# Patient Record
Sex: MALE | Race: OTHER | NOT HISPANIC OR LATINO | Employment: FULL TIME | URBAN - METROPOLITAN AREA
[De-identification: names, ages, dates, MRNs, and addresses within clinical notes are randomized per-mention and may not be internally consistent; named-entity substitution may affect disease eponyms.]

---

## 2017-04-04 DIAGNOSIS — E78.5 DYSLIPIDEMIA: ICD-10-CM

## 2017-04-04 DIAGNOSIS — K21.9 GASTROESOPHAGEAL REFLUX DISEASE WITHOUT ESOPHAGITIS: ICD-10-CM

## 2017-04-04 RX ORDER — OMEPRAZOLE 40 MG/1
40 CAPSULE, DELAYED RELEASE ORAL
Qty: 30 CAP | Refills: 11 | Status: SHIPPED | OUTPATIENT
Start: 2017-04-04 | End: 2018-03-28 | Stop reason: SDUPTHER

## 2017-04-04 RX ORDER — SIMVASTATIN 40 MG
40 TABLET ORAL
Qty: 30 TAB | Refills: 11 | Status: SHIPPED | OUTPATIENT
Start: 2017-04-04 | End: 2018-03-28 | Stop reason: SDUPTHER

## 2017-04-13 ENCOUNTER — OFFICE VISIT (OUTPATIENT)
Dept: MEDICAL GROUP | Facility: MEDICAL CENTER | Age: 62
End: 2017-04-13
Payer: COMMERCIAL

## 2017-04-13 VITALS
OXYGEN SATURATION: 96 % | RESPIRATION RATE: 16 BRPM | TEMPERATURE: 96.8 F | HEART RATE: 79 BPM | BODY MASS INDEX: 36.17 KG/M2 | DIASTOLIC BLOOD PRESSURE: 80 MMHG | SYSTOLIC BLOOD PRESSURE: 138 MMHG | HEIGHT: 76 IN | WEIGHT: 297 LBS

## 2017-04-13 DIAGNOSIS — J06.9 UPPER RESPIRATORY TRACT INFECTION, UNSPECIFIED TYPE: ICD-10-CM

## 2017-04-13 PROCEDURE — 99214 OFFICE O/P EST MOD 30 MIN: CPT | Performed by: NURSE PRACTITIONER

## 2017-04-13 RX ORDER — PROMETHAZINE HYDROCHLORIDE AND CODEINE PHOSPHATE 6.25; 1 MG/5ML; MG/5ML
5 SYRUP ORAL 4 TIMES DAILY PRN
Qty: 120 ML | Refills: 0 | Status: SHIPPED | OUTPATIENT
Start: 2017-04-13 | End: 2017-04-27

## 2017-04-13 RX ORDER — DOXYCYCLINE HYCLATE 100 MG
100 TABLET ORAL 2 TIMES DAILY
Qty: 20 TAB | Refills: 0 | Status: SHIPPED | OUTPATIENT
Start: 2017-04-13 | End: 2017-04-23

## 2017-04-13 ASSESSMENT — ENCOUNTER SYMPTOMS
SORE THROAT: 1
COUGH: 1
SPUTUM PRODUCTION: 1

## 2017-04-13 NOTE — PROGRESS NOTES
Subjective:      Elver Campos is a 61 y.o. male who presents with Conjunctivitis and Cough            Conjunctivitis  Associated symptoms include congestion, coughing and a sore throat.   Cough  Associated symptoms include a sore throat.   Elver Campos is a patient of Dr. San here today for new problem with cough.    Patient reports he has been ill for almost a week and it is not improving. His symptoms include cough, sore throat, recent left eye irritation, insomnia, and expectoration of small amounts of phlegm. He denies fever, history of allergies, chills, myalgias, chest pain or shortness of breath. He has had his flu shot and pneumonia shot this year. He has tried over-the-counter products but they have not helped. He does smoke occasional cigars. Symptoms are worse when he lays down at night and nothing has made him feel better. He states he does have lab work still pending for Dr. San for his diabetes which he plans on doing in the next few weeks.        Current Outpatient Prescriptions   Medication Sig Dispense Refill   • doxycycline (VIBRAMYCIN) 100 MG Tab Take 1 Tab by mouth 2 times a day for 10 days. 20 Tab 0   • promethazine-codeine (PHENERGAN-CODEINE) 6.25-10 MG/5ML Syrup Take 5 mL by mouth 4 times a day as needed. 120 mL 0   • omeprazole (PRILOSEC) 40 MG delayed-release capsule Take 1 Cap by mouth every day. 30 Cap 11   • simvastatin (ZOCOR) 40 MG Tab Take 1 Tab by mouth every bedtime. TAKE 1 TAB BY MOUTH EVERY EVENING. 30 Tab 11   • BD PEN NEEDLE DIPESH U/F 32G X 4 MM Misc USE 1 DAILY AS DIRECTED 100 Each 2   • metformin (GLUCOPHAGE) 1000 MG tablet Take 1 Tab by mouth 2 times a day, with meals. 60 Tab 5   • Blood Glucose Monitoring Suppl SUPPLIES Misc Test strips order: Test strips for One Touch Ultra  meter. Sig: use qday. and prn ssx high or low sugar #100 RF x 3 100 Each 11   • liraglutide (VICTOZA) 18 MG/3ML Solution Pen-injector injection Inject 0.3 mL as instructed every day. 3  "PEN 3   • Empagliflozin (JARDIANCE) 25 MG Tab Take 25 mg by mouth every day. 90 Tab 3   • hydrochlorothiazide (HYDRODIURIL) 25 MG Tab Take 1 Tab by mouth every day. TAKE 1 TABLET BY MOUTH DAILY 30 Tab 0   • losartan (COZAAR) 100 MG Tab Take 1 Tab by mouth every day. 30 Tab 11   • Blood Glucose Monitoring Suppl BRYCE Meter: Dispense One Touch Ultra  meter. Sig. Use as directed for blood sugar monitoring. #1. NR. 1 Device 0   • Lancets MISC Lancets order: Lancets for One Touch Ultra meter. Sig: use qday and prn ssx high or low sugar. #100 RF x 3 100 Each 3   • vitamin D (CHOLECALCIFEROL) 1000 UNIT TABS Take 1,000 Units by mouth every day.       • albuterol (PROVENTIL) 90 MCG/ACT AERS Inhale 2 Puffs by mouth every 6 hours as needed for Shortness of Breath. 1 Inhaler 3   • aspirin 81 MG EC tablet Take  by mouth.       No current facility-administered medications for this visit.     Social History   Substance Use Topics   • Smoking status: Current Some Day Smoker     Types: Cigars   • Smokeless tobacco: Never Used      Comment: 2 cigar 1 mo   • Alcohol Use: No     Past Medical History   Diagnosis Date   • Glucose intolerance (impaired glucose tolerance) 5/19/2009   • HTN 5/19/2009   • Obesity 5/19/2009   • DM (diabetes mellitus) (CMS-[facility]) 1/18/2011     Family History   Problem Relation Age of Onset   • Genetic Neg Hx    • Diabetes Mother        Review of Systems   HENT: Positive for congestion and sore throat.    Respiratory: Positive for cough and sputum production.    All other systems reviewed and are negative.         Objective:     /80 mmHg  Pulse 79  Temp(Src) 36 °C (96.8 °F)  Resp 16  Ht 1.93 m (6' 3.98\")  Wt 134.718 kg (297 lb)  BMI 36.17 kg/m2  SpO2 96%     Physical Exam   Constitutional: He is oriented to person, place, and time. He appears well-developed and well-nourished. No distress.   HENT:   Head: Normocephalic and atraumatic.   Right Ear: External ear normal.   Left Ear: External ear normal. "   Nose: Mucosal edema and rhinorrhea present.   Mouth/Throat: Oropharynx is clear and moist.   Eyes: Conjunctivae are normal. Right eye exhibits no discharge. Left eye exhibits no discharge.   Neck: Normal range of motion. Neck supple. No tracheal deviation present. No thyromegaly present.   Cardiovascular: Normal rate, regular rhythm and normal heart sounds.    No murmur heard.  Pulmonary/Chest: Effort normal and breath sounds normal. No respiratory distress. He has no wheezes. He has no rales.   Lymphadenopathy:     He has no cervical adenopathy.   Neurological: He is alert and oriented to person, place, and time. Coordination normal.   Skin: Skin is warm and dry. No rash noted. He is not diaphoretic. No erythema.   Psychiatric: He has a normal mood and affect. His behavior is normal. Judgment and thought content normal.   Nursing note and vitals reviewed.              Assessment/Plan:     1. Upper respiratory tract infection, unspecified type  Teaching included:  A. Drink plenty of fluids to keep yourself hydrated. Warm fluids like tea and hot soup are better.  B. Increase humidity in the house with a humidifier or place your head over a steaming pot.  C. Use Tylenol or Motrin for aches, pains, or fever.  D. Get plenty of rest to allow your body time to heal.  E. Use OTC Mucinex to loosen mucous.  F. Try OTC Afrin for up to 3 days for nasal congestion. Avoid Sudafed products.  G. Contact the office or return for appt. if you develope worsening symptoms or do not improve in the next week.  H. Avoid lung irritants such as tobacco smoke or blowing dust.  - doxycycline (VIBRAMYCIN) 100 MG Tab; Take 1 Tab by mouth 2 times a day for 10 days.  Dispense: 20 Tab; Refill: 0. Patient to start medicine if no improvement with treatments listed above.  - promethazine-codeine (PHENERGAN-CODEINE) 6.25-10 MG/5ML Syrup; Take 5 mL by mouth 4 times a day as needed.  Dispense: 120 mL; Refill: 0. Patient reminded only to use the  medicine when he does not need to be alert.    Patient to do lab work and follow-up with Dr. San for his diabetes.

## 2017-04-13 NOTE — MR AVS SNAPSHOT
"Elver Campos   2017 3:20 PM   Office Visit   MRN: [id number]    Department:  [address] [place] [address]   Dept Phone:  [phone]    Description:  Male : 1955   Provider:  ISH Muñiz           Reason for Visit     Conjunctivitis left eye started this morning    Cough x 6 days       Allergies as of 2017     No Known Allergies      You were diagnosed with     Upper respiratory tract infection, unspecified type   [[id number]]         Vital Signs     Blood Pressure Pulse Temperature Respirations Height Weight    138/80 mmHg 79 36 °C (96.8 °F) 16 1.93 m (6' 3.98\") 134.718 kg (297 lb)    Body Mass Index Oxygen Saturation Smoking Status             36.17 kg/m2 96% Current Some Day Smoker         Basic Information     Date Of Birth Sex Race Ethnicity Preferred Language    1955 Male Other Non- English      Problem List              ICD-10-CM Priority Class Noted - Resolved    DM II (diabetes mellitus, type II), controlled (CMS-HCC) E11.9   2011 - Present    Dyslipidemia E78.5   2011 - Present    Tobacco use Z72.0   9/3/2014 - Present    Obesity, Class II, BMI 35-39.9 (CMS-HCC) E66.01   9/3/2014 - Present    Gastroesophageal reflux disease without esophagitis K21.9   2015 - Present    Essential hypertension I10   2015 - Present      Health Maintenance        Date Due Completion Dates    IMM HEP B VACCINE (1 of 3 - Primary Series) 1955 ---    IMM DTaP/Tdap/Td Vaccine (1 - Tdap) 1975 ---    IMM ZOSTER VACCINE 2016 ---    A1C SCREENING 2016, 2015, 2015, 2014, 2013, 2013, 2012, 2012, 2011, 2011, 2010    URINE ACR / MICROALBUMIN 2017, 2014, 2013, 2013, 2012    FASTING LIPID PROFILE 2017 (Done), 2014, 2013, 2013, 2012, 2012, 2011, 2011, 2010    Override on 2016: Done    SERUM CREATININE 2017 " 7/8/2016 (Done), 8/29/2014, 11/14/2013, 2/20/2013, 8/23/2012, 2/8/2012, 6/21/2011, 1/11/2011, 4/14/2010    Override on 7/8/2016: Done    DIABETES MONOFILAMENT / LE EXAM 7/8/2017 7/8/2016, 1/27/2015, 12/9/2013, 2/25/2013 (Done), 7/20/2011 (Done)    Override on 2/25/2013: Done    Override on 7/20/2011: Done    RETINAL SCREENING 7/19/2017 7/19/2016 (Done), 4/15/2015, 11/20/2013    Override on 7/19/2016: Done    COLONOSCOPY 2/18/2018 2/18/2015            Current Immunizations     Influenza Vaccine Quad Inj (Preserved) 10/2/2015    Pneumococcal polysaccharide vaccine (PPSV-23) 10/18/2016      Below and/or attached are the medications your provider expects you to take. Review all of your home medications and newly ordered medications with your provider and/or pharmacist. Follow medication instructions as directed by your provider and/or pharmacist. Please keep your medication list with you and share with your provider. Update the information when medications are discontinued, doses are changed, or new medications (including over-the-counter products) are added; and carry medication information at all times in the event of emergency situations     Allergies:  No Known Allergies          Medications  Valid as of: April 13, 2017 -  3:42 PM    Generic Name Brand Name Tablet Size Instructions for use    Albuterol (Aero Soln) PROVENTIL 90 MCG/ACT Inhale 2 Puffs by mouth every 6 hours as needed for Shortness of Breath.        Aspirin (Tablet Delayed Response) aspirin 81 MG Take  by mouth.        Blood Glucose Monitoring Suppl (Device) Blood Glucose Monitoring Suppl  Meter: Dispense One Touch Ultra  meter. Sig. Use as directed for blood sugar monitoring. #1. NR.        Blood Glucose Monitoring Suppl (Misc) Blood Glucose Monitoring Suppl SUPPLIES Test strips order: Test strips for One Touch Ultra  meter. Sig: use qday. and prn ssx high or low sugar #100 RF x 3        Cholecalciferol (Tab) cholecalciferol 1000 UNIT Take 1,000 Units  by mouth every day.          Doxycycline Hyclate (Tab) VIBRAMYCIN 100 MG Take 1 Tab by mouth 2 times a day for 10 days.        Empagliflozin (Tab) Empagliflozin 25 MG Take 25 mg by mouth every day.        HydroCHLOROthiazide (Tab) HYDRODIURIL 25 MG Take 1 Tab by mouth every day. TAKE 1 TABLET BY MOUTH DAILY        Insulin Pen Needle (Misc) BD PEN NEEDLE DIPESH U/F 32G X 4 MM USE 1 DAILY AS DIRECTED        Lancets (Misc) Lancets  Lancets order: Lancets for One Touch Ultra meter. Sig: use qday and prn ssx high or low sugar. #100 RF x 3        Liraglutide (Solution Pen-injector) VICTOZA 18 MG/3ML Inject 0.3 mL as instructed every day.        Losartan Potassium (Tab) COZAAR 100 MG Take 1 Tab by mouth every day.        MetFORMIN HCl (Tab) GLUCOPHAGE 1000 MG Take 1 Tab by mouth 2 times a day, with meals.        Omeprazole (CAPSULE DELAYED RELEASE) PRILOSEC 40 MG Take 1 Cap by mouth every day.        Promethazine-Codeine (Syrup) PHENERGAN-CODEINE 6.25-10 MG/5ML Take 5 mL by mouth 4 times a day as needed.        Simvastatin (Tab) ZOCOR 40 MG Take 1 Tab by mouth every bedtime. TAKE 1 TAB BY MOUTH EVERY EVENING.        .                 Medicines prescribed today were sent to:     [id number] - [place] [address] [facility]    [address] [place] [address]    Phone: [phone] Fax: [phone]    Open 24 Hours?: No      Medication refill instructions:       If your prescription bottle indicates you have medication refills left, it is not necessary to call your provider’s office. Please contact your pharmacy and they will refill your medication.    If your prescription bottle indicates you do not have any refills left, you may request refills at any time through one of the following ways: The online [facility] system (except Urgent Care), by calling your provider’s office, or by asking your pharmacy to contact your provider’s office with a refill request. Medication refills are processed only during regular business  hours and may not be available until the next business day. Your provider may request additional information or to have a follow-up visit with you prior to refilling your medication.   *Please Note: Medication refills are assigned a new Rx number when refilled electronically. Your pharmacy may indicate that no refills were authorized even though a new prescription for the same medication is available at the pharmacy. Please request the medicine by name with the pharmacy before contacting your provider for a refill.           [employer] Access Code: [email or web address]  Expires: 5/13/2017  3:42 PM    [employer]  A secure, online tool to manage your health information     [employer]’s [employer]® is a secure, online tool that connects you to your personalized health information from the privacy of your home -- day or night - making it very easy for you to manage your healthcare. Once the activation process is completed, you can even access your medical information using the [employer] roxana, which is available for free in the Apple Roxana store or Google Play store.     [employer] provides the following levels of access (as shown below):   My Chart Features   [facility]  Specialists [facility]  Urgent  Care Non-[facility]  Primary Care  Doctor   Email your healthcare team securely and privately 24/7 X X X    Manage appointments: schedule your next appointment; view details of past/upcoming appointments X      Request prescription refills. X      View recent personal medical records, including lab and immunizations X X X X   View health record, including health history, allergies, medications X X X X   Read reports about your outpatient visits, procedures, consult and ER notes X X X X   See your discharge summary, which is a recap of your hospital and/or ER visit that includes your diagnosis, lab results, and care plan. X X       How to register for [employer]:  1. Go to  [email or web address].  2. Click on the Sign Up  Now box, which takes you to the New Member Sign Up page. You will need to provide the following information:  a. Enter your [employer] Access Code exactly as it appears at the top of this page. (You will not need to use this code after you’ve completed the sign-up process. If you do not sign up before the expiration date, you must request a new code.)   b. Enter your date of birth.   c. Enter your home email address.   d. Click Submit, and follow the next screen’s instructions.  3. Create a [employer] ID. This will be your [employer] login ID and cannot be changed, so think of one that is secure and easy to remember.  4. Create a [employer] password. You can change your password at any time.  5. Enter your Password Reset Question and Answer. This can be used at a later time if you forget your password.   6. Enter your e-mail address. This allows you to receive e-mail notifications when new information is available in [employer].  7. Click Sign Up. You can now view your health information.    For assistance activating your [employer] account, call [phone]        Quit Tobacco Information     Do you want to quit using tobacco?    Quitting tobacco decreases risks of cancer, heart and lung disease, increases life expectancy, improves sense of taste and smell, and increases spending money, among other benefits.    If you are thinking about quitting, we can help.  • [facility] Quit Tobacco Program: [phone]  o Program occurs weekly for four weeks and includes pharmacist consultation on products to support quitting smoking or chewing tobacco. A provider referral is needed for pharmacist consultation.  • Tobacco Users Help Hotline: 7-800-QUIT-NOW (706-9193) or https://[place].quitlogix.org/  o Free, confidential telephone and online coaching for [place] residents. Sessions are designed on a schedule that is convenient for you. Eligible clients receive free nicotine replacement therapy.  • Nationally: www.smokefree.gov  o Information and  professional assistance to support both immediate and long-term needs as you become, and remain, a non-smoker. Smokefree.gov allows you to choose the help that best fits your needs.

## 2017-04-27 DIAGNOSIS — I10 ESSENTIAL HYPERTENSION: ICD-10-CM

## 2017-04-27 RX ORDER — HYDROCHLOROTHIAZIDE 25 MG/1
25 TABLET ORAL DAILY
Qty: 90 TAB | OUTPATIENT
Start: 2017-04-27

## 2017-04-27 RX ORDER — HYDROCHLOROTHIAZIDE 25 MG/1
25 TABLET ORAL DAILY
Qty: 30 TAB | Refills: 11 | Status: SHIPPED | OUTPATIENT
Start: 2017-04-27 | End: 2018-03-28 | Stop reason: SDUPTHER

## 2017-06-23 ENCOUNTER — TELEPHONE (OUTPATIENT)
Dept: MEDICAL GROUP | Facility: MEDICAL CENTER | Age: 62
End: 2017-06-23

## 2017-06-23 RX ORDER — HYDROCORTISONE ACETATE 25 MG/1
25 SUPPOSITORY RECTAL EVERY 12 HOURS
Qty: 20 SUPPOSITORY | Refills: 0 | Status: SHIPPED | OUTPATIENT
Start: 2017-06-23 | End: 2020-06-30

## 2017-06-23 NOTE — TELEPHONE ENCOUNTER
1. Caller Name: Marlon patient wife                                         Call Back Number: [phone]        Patient approves a detailed voicemail message: N\A    Marlon called for the patient she said the patient wanted to know if you would order a medication for hemorrhoids, because patient is in a lot of pain and has tried otc medication for this but it is not helping? Please advise

## 2017-08-03 LAB
ALBUMIN SERPL-MCNC: 4.4 G/DL (ref 3.6–4.8)
ALBUMIN/CREAT UR: 26.2 MG/G CREAT (ref 0–30)
ALBUMIN/GLOB SERPL: 1.3 {RATIO} (ref 1.2–2.2)
ALP SERPL-CCNC: 68 IU/L (ref 39–117)
ALT SERPL-CCNC: 24 IU/L (ref 0–32)
AST SERPL-CCNC: 26 IU/L (ref 0–40)
BILIRUB SERPL-MCNC: 0.4 MG/DL (ref 0–1.2)
BUN SERPL-MCNC: 13 MG/DL (ref 8–27)
BUN/CREAT SERPL: 11 (ref 12–28)
CALCIUM SERPL-MCNC: 9.7 MG/DL (ref 8.7–10.3)
CHLORIDE SERPL-SCNC: 96 MMOL/L (ref 96–106)
CHOLEST SERPL-MCNC: 119 MG/DL (ref 100–199)
CO2 SERPL-SCNC: 25 MMOL/L (ref 18–29)
COMMENT 011824: NORMAL
CREAT SERPL-MCNC: 1.14 MG/DL (ref 0.57–1)
CREAT UR-MCNC: 145.3 MG/DL
GLOBULIN SER CALC-MCNC: 3.3 G/DL (ref 1.5–4.5)
GLUCOSE SERPL-MCNC: 113 MG/DL (ref 65–99)
HBA1C MFR BLD: 7.2 % (ref 4.8–5.6)
HDLC SERPL-MCNC: 43 MG/DL
LDLC SERPL CALC-MCNC: 47 MG/DL (ref 0–99)
MICROALBUMIN UR-MCNC: 38 UG/ML
POTASSIUM SERPL-SCNC: 3.8 MMOL/L (ref 3.5–5.2)
PROT SERPL-MCNC: 7.7 G/DL (ref 6–8.5)
SODIUM SERPL-SCNC: 139 MMOL/L (ref 134–144)
TRIGL SERPL-MCNC: 147 MG/DL (ref 0–149)
VLDLC SERPL CALC-MCNC: 29 MG/DL (ref 5–40)

## 2017-08-08 ENCOUNTER — OFFICE VISIT (OUTPATIENT)
Dept: MEDICAL GROUP | Facility: MEDICAL CENTER | Age: 62
End: 2017-08-08
Payer: COMMERCIAL

## 2017-08-08 VITALS
HEART RATE: 80 BPM | BODY MASS INDEX: 35.56 KG/M2 | WEIGHT: 292 LBS | HEIGHT: 76 IN | SYSTOLIC BLOOD PRESSURE: 132 MMHG | RESPIRATION RATE: 16 BRPM | OXYGEN SATURATION: 95 % | TEMPERATURE: 98.4 F | DIASTOLIC BLOOD PRESSURE: 78 MMHG

## 2017-08-08 DIAGNOSIS — Z00.00 WELLNESS EXAMINATION: ICD-10-CM

## 2017-08-08 DIAGNOSIS — E11.9 CONTROLLED TYPE 2 DIABETES MELLITUS WITHOUT COMPLICATION, WITHOUT LONG-TERM CURRENT USE OF INSULIN (HCC): ICD-10-CM

## 2017-08-08 PROCEDURE — 99396 PREV VISIT EST AGE 40-64: CPT | Performed by: INTERNAL MEDICINE

## 2017-08-08 NOTE — MR AVS SNAPSHOT
"Elver Campos   2017 7:40 AM   Office Visit   MRN: [id number]    Department:  [address] [place] [address]   Dept Phone:  [phone]    Description:  Male : 1955   Provider:  Luis Antonio San M.D.           Reason for Visit     Annual Exam           Allergies as of 2017     No Known Allergies      You were diagnosed with     Wellness examination   [[id number]]       Controlled type 2 diabetes mellitus without complication, without long-term current use of insulin (CMS-HCC)   [[id number]]         Vital Signs     Blood Pressure Pulse Temperature Respirations Height Weight    132/78 mmHg 80 36.9 °C (98.4 °F) 16 1.93 m (6' 3.98\") 132.45 kg (292 lb)    Body Mass Index Oxygen Saturation Smoking Status             35.56 kg/m2 95% Current Some Day Smoker         Basic Information     Date Of Birth Sex Race Ethnicity Preferred Language    1955 Male Other Non- English      Problem List              ICD-10-CM Priority Class Noted - Resolved    DM II (diabetes mellitus, type II), controlled (CMS-HCC) E11.9   2011 - Present    Dyslipidemia E78.5   2011 - Present    Tobacco use Z72.0   9/3/2014 - Present    Obesity, Class II, BMI 35-39.9 (CMS-HCC) E66.9   9/3/2014 - Present    Gastroesophageal reflux disease without esophagitis K21.9   2015 - Present    Essential hypertension I10   2015 - Present      Health Maintenance        Date Due Completion Dates    IMM DTaP/Tdap/Td Vaccine (1 - Tdap) 1975 ---    IMM ZOSTER VACCINE 2016 ---    DIABETES MONOFILAMENT / LE EXAM 2017, 2015, 2013, 2013 (Done), 2011 (Done)    Override on 2013: Done    Override on 2011: Done    RETINAL SCREENING 2017 (Done), 4/15/2015, 2013    Override on 2016: Done    IMM INFLUENZA (1) 2017 10/2/2015    A1C SCREENING 2018, 2016, 2015, 2015, 2014, 2013, 2013, 2012, 2012, 2011, " 1/11/2011, 4/14/2010    COLONOSCOPY 2/18/2018 2/18/2015    FASTING LIPID PROFILE 8/2/2018 8/2/2017, 7/8/2016 (Done), 8/29/2014, 11/14/2013, 2/20/2013, 8/23/2012, 2/8/2012, 6/21/2011, 1/11/2011, 4/14/2010    Override on 7/8/2016: Done    URINE ACR / MICROALBUMIN 8/2/2018 8/2/2017, 6/30/2016, 8/29/2014, 11/14/2013, 2/20/2013, 2/8/2012    SERUM CREATININE 8/2/2018 8/2/2017, 7/8/2016 (Done), 8/29/2014, 11/14/2013, 2/20/2013, 8/23/2012, 2/8/2012, 6/21/2011, 1/11/2011, 4/14/2010    Override on 7/8/2016: Done            Current Immunizations     Influenza Vaccine Quad Inj (Preserved) 10/2/2015    Pneumococcal polysaccharide vaccine (PPSV-23) 10/18/2016      Below and/or attached are the medications your provider expects you to take. Review all of your home medications and newly ordered medications with your provider and/or pharmacist. Follow medication instructions as directed by your provider and/or pharmacist. Please keep your medication list with you and share with your provider. Update the information when medications are discontinued, doses are changed, or new medications (including over-the-counter products) are added; and carry medication information at all times in the event of emergency situations     Allergies:  No Known Allergies          Medications  Valid as of: August 08, 2017 -  8:05 AM    Generic Name Brand Name Tablet Size Instructions for use    Albuterol (Aero Soln) PROVENTIL 90 MCG/ACT Inhale 2 Puffs by mouth every 6 hours as needed for Shortness of Breath.        Aspirin (Tablet Delayed Response) aspirin 81 MG Take  by mouth.        Blood Glucose Monitoring Suppl (Device) Blood Glucose Monitoring Suppl  Meter: Dispense One Touch Ultra  meter. Sig. Use as directed for blood sugar monitoring. #1. NR.        Blood Glucose Monitoring Suppl (Misc) Blood Glucose Monitoring Suppl SUPPLIES Test strips order: Test strips for One Touch Ultra  meter. Sig: use qday. and prn ssx high or low sugar #100 RF x 3        Cholecalciferol (Tab) cholecalciferol 1000 UNIT Take 1,000 Units by mouth every day.          Empagliflozin (Tab) Empagliflozin 25 MG Take 25 mg by mouth every day.        HydroCHLOROthiazide (Tab) HYDRODIURIL 25 MG Take 1 Tab by mouth every day. TAKE 1 TABLET BY MOUTH DAILY        Hydrocortisone Acetate (Suppos) ANUSOL-HC 25 MG Insert 1 Suppository in rectum every 12 hours.        Insulin Pen Needle (Misc) BD PEN NEEDLE DIPESH U/F 32G X 4 MM USE 1 DAILY AS DIRECTED        Lancets (Misc) Lancets  Lancets order: Lancets for One Touch Ultra meter. Sig: use qday and prn ssx high or low sugar. #100 RF x 3        Liraglutide (Solution Pen-injector) VICTOZA 18 MG/3ML Inject 0.3 mL as instructed every day.        Losartan Potassium (Tab) COZAAR 100 MG TAKE 1 TAB BY MOUTH EVERY DAY.        MetFORMIN HCl (Tab) GLUCOPHAGE 1000 MG Take 1 Tab by mouth 2 times a day, with meals.        Omeprazole (CAPSULE DELAYED RELEASE) PRILOSEC 40 MG Take 1 Cap by mouth every day.        Pramoxine-HC (Cream) PROTOFOAM-HC 1-2.5 % Apply 1 g to affected area(s) 3 times a day.        Simvastatin (Tab) ZOCOR 40 MG Take 1 Tab by mouth every bedtime. TAKE 1 TAB BY MOUTH EVERY EVENING.        .                 Medicines prescribed today were sent to:     [facility]Y #[id number] - [place] - [address]    [address] [place] [address]    Phone: [phone] Fax: [phone]    Open 24 Hours?: No      Medication refill instructions:       If your prescription bottle indicates you have medication refills left, it is not necessary to call your provider’s office. Please contact your pharmacy and they will refill your medication.    If your prescription bottle indicates you do not have any refills left, you may request refills at any time through one of the following ways: The online Spyra system (except Urgent Care), by calling your provider’s office, or by asking your pharmacy to contact your provider’s office with a refill request. Medication  refills are processed only during regular business hours and may not be available until the next business day. Your provider may request additional information or to have a follow-up visit with you prior to refilling your medication.   *Please Note: Medication refills are assigned a new Rx number when refilled electronically. Your pharmacy may indicate that no refills were authorized even though a new prescription for the same medication is available at the pharmacy. Please request the medicine by name with the pharmacy before contacting your provider for a refill.        Referral     A referral request has been sent to our patient care coordination department. Please allow 3-5 business days for us to process this request and contact you either by phone or mail. If you do not hear from us by the 5th business day, please call us at [phone].           Symbolic IO Access Code: Activation code not generated  Current Symbolic IO Status: Active          Quit Tobacco Information     Do you want to quit using tobacco?    Quitting tobacco decreases risks of cancer, heart and lung disease, increases life expectancy, improves sense of taste and smell, and increases spending money, among other benefits.    If you are thinking about quitting, we can help.  • [facility] Quit Tobacco Program: [phone]  o Program occurs weekly for four weeks and includes pharmacist consultation on products to support quitting smoking or chewing tobacco. A provider referral is needed for pharmacist consultation.  • Tobacco Users Help Hotline: 8-326-QUIT-NOW (089-5683) or https://[place].quitlogix.org/  o Free, confidential telephone and online coaching for [place] residents. Sessions are designed on a schedule that is convenient for you. Eligible clients receive free nicotine replacement therapy.  • Nationally: www.smokefree.gov  o Information and professional assistance to support both immediate and long-term needs as you become, and remain, a  non-smoker. Smokefree.gov allows you to choose the help that best fits your needs.

## 2017-08-08 NOTE — PROGRESS NOTES
CC: Wellness examination.    HPI:   Elver presents today with the following.    1. Wellness examination  Presents compliant with all major screenings recent blood work shows good control of diabetes. He is compliant with medications. He has no physical complaints denies any chest pain or shortness of breath no edema. He is coming due for retinal eye exam. He is trying to stay active and his weight is down but again he did have a month fasting in July. No other physical complaints today.        Patient Active Problem List    Diagnosis Date Noted   • Essential hypertension 06/24/2015   • Gastroesophageal reflux disease without esophagitis 06/09/2015   • Tobacco use 09/03/2014   • Obesity, Class II, BMI 35-39.9 (CMS-HCC) 09/03/2014   • DM II (diabetes mellitus, type II), controlled (CMS-HCC) 01/18/2011   • Dyslipidemia 01/18/2011       Current Outpatient Prescriptions   Medication Sig Dispense Refill   • pramoxine-hydrocortisone (PROTOFOAM-HC) cream Apply 1 g to affected area(s) 3 times a day. 630 g 3   • losartan (COZAAR) 100 MG Tab TAKE 1 TAB BY MOUTH EVERY DAY. 30 Tab 11   • hydrocortisone (ANUSOL-HC) 25 MG Suppos Insert 1 Suppository in rectum every 12 hours. 20 Suppository 0   • liraglutide (VICTOZA) 18 MG/3ML Solution Pen-injector injection Inject 0.3 mL as instructed every day. 3 PEN 3   • hydrochlorothiazide (HYDRODIURIL) 25 MG Tab Take 1 Tab by mouth every day. TAKE 1 TABLET BY MOUTH DAILY 30 Tab 11   • omeprazole (PRILOSEC) 40 MG delayed-release capsule Take 1 Cap by mouth every day. 30 Cap 11   • simvastatin (ZOCOR) 40 MG Tab Take 1 Tab by mouth every bedtime. TAKE 1 TAB BY MOUTH EVERY EVENING. 30 Tab 11   • BD PEN NEEDLE DIPESH U/F 32G X 4 MM Misc USE 1 DAILY AS DIRECTED 100 Each 2   • metformin (GLUCOPHAGE) 1000 MG tablet Take 1 Tab by mouth 2 times a day, with meals. 60 Tab 5   • Blood Glucose Monitoring Suppl SUPPLIES Misc Test strips order: Test strips for One Touch Ultra  meter. Sig: use qday. and prn  "ssx high or low sugar #100 RF x 3 100 Each 11   • Empagliflozin (JARDIANCE) 25 MG Tab Take 25 mg by mouth every day. 90 Tab 3   • Blood Glucose Monitoring Suppl BRYCE Meter: Dispense One Touch Ultra  meter. Sig. Use as directed for blood sugar monitoring. #1. NR. 1 Device 0   • Lancets MISC Lancets order: Lancets for One Touch Ultra meter. Sig: use qday and prn ssx high or low sugar. #100 RF x 3 100 Each 3   • vitamin D (CHOLECALCIFEROL) 1000 UNIT TABS Take 1,000 Units by mouth every day.       • albuterol (PROVENTIL) 90 MCG/ACT AERS Inhale 2 Puffs by mouth every 6 hours as needed for Shortness of Breath. 1 Inhaler 3   • aspirin 81 MG EC tablet Take  by mouth.       No current facility-administered medications for this visit.         Allergies as of 08/08/2017   • (No Known Allergies)        ROS: As per HPI.    /78 mmHg  Pulse 80  Temp(Src) 36.9 °C (98.4 °F)  Resp 16  Ht 1.93 m (6' 3.98\")  Wt 132.45 kg (292 lb)  BMI 35.56 kg/m2  SpO2 95%    Physical Exam:  Gen:         Alert and oriented, No apparent distress.  Neck:        No Lymphadenopathy or Bruits.  Lungs:     Clear to auscultation bilaterally  CV:          Regular rate and rhythm. No murmurs, rubs or gallops.               Ext:          No clubbing, cyanosis, edema.      Assessment and Plan.   62 y.o. male with the following issues.    1. Wellness examination  Discussed healthy lifestyle habits as well as screening regimens.        "

## 2017-11-03 RX ORDER — LIRAGLUTIDE 6 MG/ML
1.8 INJECTION SUBCUTANEOUS DAILY
Qty: 9 PEN | Refills: 3 | Status: SHIPPED | OUTPATIENT
Start: 2017-11-03 | End: 2018-03-28

## 2017-11-07 RX ORDER — EMPAGLIFLOZIN 25 MG/1
TABLET, FILM COATED ORAL
Qty: 90 TAB | Refills: 3 | Status: SHIPPED | OUTPATIENT
Start: 2017-11-07 | End: 2018-12-01 | Stop reason: SDUPTHER

## 2017-11-16 RX ORDER — LIRAGLUTIDE 6 MG/ML
1.8 INJECTION SUBCUTANEOUS DAILY
Qty: 9 PEN | Refills: 3 | Status: SHIPPED | OUTPATIENT
Start: 2017-11-16 | End: 2018-12-03 | Stop reason: SDUPTHER

## 2018-03-28 ENCOUNTER — OFFICE VISIT (OUTPATIENT)
Dept: MEDICAL GROUP | Facility: MEDICAL CENTER | Age: 63
End: 2018-03-28
Payer: COMMERCIAL

## 2018-03-28 VITALS
HEART RATE: 86 BPM | BODY MASS INDEX: 35.8 KG/M2 | TEMPERATURE: 97 F | WEIGHT: 294 LBS | OXYGEN SATURATION: 95 % | HEIGHT: 76 IN | RESPIRATION RATE: 16 BRPM | SYSTOLIC BLOOD PRESSURE: 118 MMHG | DIASTOLIC BLOOD PRESSURE: 72 MMHG

## 2018-03-28 DIAGNOSIS — E78.5 DYSLIPIDEMIA: ICD-10-CM

## 2018-03-28 DIAGNOSIS — E11.9 CONTROLLED TYPE 2 DIABETES MELLITUS WITHOUT COMPLICATION, WITHOUT LONG-TERM CURRENT USE OF INSULIN (HCC): ICD-10-CM

## 2018-03-28 DIAGNOSIS — Z12.11 SCREEN FOR COLON CANCER: ICD-10-CM

## 2018-03-28 DIAGNOSIS — I10 ESSENTIAL HYPERTENSION: ICD-10-CM

## 2018-03-28 DIAGNOSIS — K21.9 GASTROESOPHAGEAL REFLUX DISEASE WITHOUT ESOPHAGITIS: ICD-10-CM

## 2018-03-28 LAB
HBA1C MFR BLD: 7.9 % (ref ?–5.8)
INT CON NEG: NEGATIVE
INT CON POS: POSITIVE

## 2018-03-28 PROCEDURE — 99214 OFFICE O/P EST MOD 30 MIN: CPT | Performed by: INTERNAL MEDICINE

## 2018-03-28 PROCEDURE — 83036 HEMOGLOBIN GLYCOSYLATED A1C: CPT | Performed by: INTERNAL MEDICINE

## 2018-03-28 RX ORDER — LOSARTAN POTASSIUM 100 MG/1
100 TABLET ORAL
Qty: 90 TAB | Refills: 3 | Status: SHIPPED | OUTPATIENT
Start: 2018-03-28 | End: 2019-04-15 | Stop reason: SDUPTHER

## 2018-03-28 RX ORDER — LANCETS 30 GAUGE
EACH MISCELLANEOUS
Qty: 100 EACH | Refills: 11 | Status: SHIPPED | OUTPATIENT
Start: 2018-03-28 | End: 2018-03-29

## 2018-03-28 RX ORDER — SIMVASTATIN 40 MG
40 TABLET ORAL
Qty: 90 TAB | Refills: 3 | Status: SHIPPED | OUTPATIENT
Start: 2018-03-28 | End: 2019-03-31 | Stop reason: SDUPTHER

## 2018-03-28 RX ORDER — OMEPRAZOLE 40 MG/1
40 CAPSULE, DELAYED RELEASE ORAL
Qty: 90 CAP | Refills: 3 | Status: SHIPPED | OUTPATIENT
Start: 2018-03-28 | End: 2018-06-14

## 2018-03-28 RX ORDER — HYDROCHLOROTHIAZIDE 25 MG/1
25 TABLET ORAL DAILY
Qty: 90 TAB | Refills: 3 | Status: SHIPPED | OUTPATIENT
Start: 2018-03-28 | End: 2019-04-18 | Stop reason: SDUPTHER

## 2018-03-28 ASSESSMENT — PATIENT HEALTH QUESTIONNAIRE - PHQ9: CLINICAL INTERPRETATION OF PHQ2 SCORE: 0

## 2018-03-28 NOTE — PROGRESS NOTES
CC: Follow-up diabetes    HPI:   Elver presents today with the following.    1. Controlled type 2 diabetes mellitus without complication, without long-term current use of insulin (CMS-HCC)  Presents having A1c in office found to be 7.9 up from 7.2. He cites decreased activity as a cause. He does have his Sikhism holiday coming in June which she will be fasting during daylight hours. His cough and then he can improve these numbers in the next 3 months.    2. BMI 35.0-35.9,adult  Weight gone up slightly again upcoming dietary changes.    3. Dyslipidemia  Maintain on statin without myalgias or cholesterol test will be due for next visit    4. Essential hypertension  Well controlled compliant with medications    5. Gastroesophageal reflux disease without esophagitis  Will be needing refills.    6. Screen for colon cancer  Due for repea      Patient Active Problem List    Diagnosis Date Noted   • Essential hypertension 06/24/2015   • Gastroesophageal reflux disease without esophagitis 06/09/2015   • Tobacco use 09/03/2014   • Obesity, Class II, BMI 35-39.9 09/03/2014   • DM II (diabetes mellitus, type II), controlled (CMS-HCC) 01/18/2011   • Dyslipidemia 01/18/2011       Current Outpatient Prescriptions   Medication Sig Dispense Refill   • metformin (GLUCOPHAGE) 1000 MG tablet Take 1 Tab by mouth 2 times a day, with meals. 180 Tab 1   • hydroCHLOROthiazide (HYDRODIURIL) 25 MG Tab Take 1 Tab by mouth every day. TAKE 1 TABLET BY MOUTH DAILY 90 Tab 3   • losartan (COZAAR) 100 MG Tab Take 1 Tab by mouth every day. TAKE 1 TAB BY MOUTH EVERY DAY. 90 Tab 3   • simvastatin (ZOCOR) 40 MG Tab Take 1 Tab by mouth every bedtime. TAKE 1 TAB BY MOUTH EVERY EVENING. 90 Tab 3   • omeprazole (PRILOSEC) 40 MG delayed-release capsule Take 1 Cap by mouth every day. 90 Cap 3   • Blood Glucose Monitoring Suppl Supplies Misc Test strips order: Test strips for contour next meter. Sig: use bid. 100 Each 11   • Lancets Misc Lancets order:  "Lancets for diabetic testing meter. Sig: use bid 100 Each 11   • Blood Glucose Monitoring Suppl Device Meter: Dispense contour nex meter. Sig. Use as directed for blood sugar monitoring. #1. NR. 1 Device 0   • VICTOZA 18 MG/3ML Solution Pen-injector injection INJECT 0.3 ML AS INSTRUCTED EVERY DAY. 9 PEN 3   • JARDIANCE 25 MG Tab TAKE 1 TABLET BY MOUTH EVERY DAY 90 Tab 3   • pramoxine-hydrocortisone (PROTOFOAM-HC) cream Apply 1 g to affected area(s) 3 times a day. 630 g 3   • hydrocortisone (ANUSOL-HC) 25 MG Suppos Insert 1 Suppository in rectum every 12 hours. 20 Suppository 0   • BD PEN NEEDLE DIPESH U/F 32G X 4 MM Misc USE 1 DAILY AS DIRECTED 100 Each 2   • vitamin D (CHOLECALCIFEROL) 1000 UNIT TABS Take 1,000 Units by mouth every day.       • albuterol (PROVENTIL) 90 MCG/ACT AERS Inhale 2 Puffs by mouth every 6 hours as needed for Shortness of Breath. 1 Inhaler 3   • aspirin 81 MG EC tablet Take  by mouth.       No current facility-administered medications for this visit.          Allergies as of 03/28/2018   • (No Known Allergies)        ROS: Denies Chest pain, SOB, LE edema.    /72   Pulse 86   Temp 36.1 °C (97 °F)   Resp 16   Ht 1.93 m (6' 4\")   Wt (!) 133.4 kg (294 lb)   SpO2 95%   BMI 35.79 kg/m²      Physical Exam:  Gen:         Alert and oriented, No apparent distress.  Neck:        No Lymphadenopathy or Bruits.  Lungs:     Clear to auscultation bilaterally  CV:          Regular rate and rhythm. No murmurs, rubs or gallops.               Ext:          No clubbing, cyanosis, edema.      Assessment and Plan.   62 y.o. male with the following issues.    1. Controlled type 2 diabetes mellitus without complication, without long-term current use of insulin (CMS-[facility])  Discussion about diet and exercise recheck 3 months.  - POCT A1C  - HEMOGLOBIN A1C; Future  - MICROALBUMIN CREAT RATIO URINE; Future  - Blood Glucose Monitoring Suppl Supplies Misc; Test strips order: Test strips for contour next meter. " Sig: use bid.  Dispense: 100 Each; Refill: 11  - Lancets Misc; Lancets order: Lancets for diabetic testing meter. Sig: use bid  Dispense: 100 Each; Refill: 11  - Blood Glucose Monitoring Suppl Device; Meter: Dispense contour nex meter. Sig. Use as directed for blood sugar monitoring. #1. NR.  Dispense: 1 Device; Refill: 0    2. BMI 35.0-35.9,adult  Discussion about weight loss.  - Patient identified as having weight management issue.  Appropriate orders and counseling given.    3. Dyslipidemia  Continue statin refills today. Check before next visit  - COMP METABOLIC PANEL; Future  - LIPID PROFILE; Future  - simvastatin (ZOCOR) 40 MG Tab; Take 1 Tab by mouth every bedtime. TAKE 1 TAB BY MOUTH EVERY EVENING.  Dispense: 90 Tab; Refill: 3    4. Essential hypertension  Currently well controlled, Discuss diet, exercise and salt restriction.  No change to medication therapy.   - hydroCHLOROthiazide (HYDRODIURIL) 25 MG Tab; Take 1 Tab by mouth every day. TAKE 1 TABLET BY MOUTH DAILY  Dispense: 90 Tab; Refill: 3    5. Gastroesophageal reflux disease without esophagitis  Refilled omeprazole  - omeprazole (PRILOSEC) 40 MG delayed-release capsule; Take 1 Cap by mouth every day.  Dispense: 90 Cap; Refill: 3    6. Screen for colon cancer    - REFERRAL TO GASTROENTEROLOGY

## 2018-03-29 ENCOUNTER — TELEPHONE (OUTPATIENT)
Dept: MEDICAL GROUP | Facility: MEDICAL CENTER | Age: 63
End: 2018-03-29

## 2018-03-29 DIAGNOSIS — E11.9 CONTROLLED TYPE 2 DIABETES MELLITUS WITHOUT COMPLICATION, WITHOUT LONG-TERM CURRENT USE OF INSULIN (HCC): ICD-10-CM

## 2018-03-29 RX ORDER — LANCETS 30 GAUGE
EACH MISCELLANEOUS
Qty: 100 EACH | Refills: 11 | Status: SHIPPED | OUTPATIENT
Start: 2018-03-29 | End: 2018-03-30 | Stop reason: SDUPTHER

## 2018-03-29 NOTE — TELEPHONE ENCOUNTER
1. Caller Name: Elver                      Call Back Number: [phone] (home)       2. Message: patient needs meter and supplies resubmitted to pharmacy insurance would not cover the contour meter     3. Patient approves office to leave a detailed voicemail/MyChart message: N\A

## 2018-03-30 DIAGNOSIS — E11.9 CONTROLLED TYPE 2 DIABETES MELLITUS WITHOUT COMPLICATION, WITHOUT LONG-TERM CURRENT USE OF INSULIN (HCC): ICD-10-CM

## 2018-03-30 RX ORDER — LANCETS 30 GAUGE
EACH MISCELLANEOUS
Qty: 100 EACH | Refills: 11 | Status: SHIPPED | OUTPATIENT
Start: 2018-03-30

## 2018-03-30 NOTE — TELEPHONE ENCOUNTER
Please resend to pharmacy, spoke with pharmacy they dont have lifescan but the one touch ultra is covered

## 2018-06-12 LAB — HBA1C MFR BLD: 7.9 % (ref ?–5.8)

## 2018-06-14 ENCOUNTER — OFFICE VISIT (OUTPATIENT)
Dept: MEDICAL GROUP | Facility: MEDICAL CENTER | Age: 63
End: 2018-06-14
Payer: COMMERCIAL

## 2018-06-14 VITALS
OXYGEN SATURATION: 94 % | BODY MASS INDEX: 35.31 KG/M2 | DIASTOLIC BLOOD PRESSURE: 70 MMHG | HEART RATE: 80 BPM | SYSTOLIC BLOOD PRESSURE: 130 MMHG | TEMPERATURE: 97.1 F | HEIGHT: 76 IN | WEIGHT: 290 LBS

## 2018-06-14 DIAGNOSIS — E11.9 CONTROLLED TYPE 2 DIABETES MELLITUS WITHOUT COMPLICATION, WITHOUT LONG-TERM CURRENT USE OF INSULIN (HCC): ICD-10-CM

## 2018-06-14 DIAGNOSIS — H57.89 EYE IRRITATION: ICD-10-CM

## 2018-06-14 DIAGNOSIS — I10 ESSENTIAL HYPERTENSION: ICD-10-CM

## 2018-06-14 DIAGNOSIS — R19.5 LOOSE STOOLS: ICD-10-CM

## 2018-06-14 PROCEDURE — 99214 OFFICE O/P EST MOD 30 MIN: CPT | Performed by: INTERNAL MEDICINE

## 2018-06-14 NOTE — PROGRESS NOTES
CC: Follow-up diabetes with other complaints.    HPI:   Elver presents today with the following.    1. Controlled type 2 diabetes mellitus without complication, without long-term current use of insulin ([facility])  Seen by diabetic nurse A1c is not changed.  Significant dietary indiscretions that likely leading to the persistent elevation of A1c.    2. Eye irritation  He is complaining of irritation of the left eye mostly watering.  Denies any blurred vision with it.  He reports he was in a [place]ia [place] environment but does not feel as if there is anything foreign object.  Denies any other symptoms no drainage and no increasing pain or redness.  He is due for recheck of his eyes for diabetes as well.    3. Loose stools  He is complaining of loose stools for the last 2 weeks.  He has been on metformin for some time without any problem.  He denies any blood in stool or dark tarry stool no abdominal pain.    4. Essential hypertension  Upper end of normal on current medications.  Denying any chest pain.      Patient Active Problem List    Diagnosis Date Noted   • Essential hypertension 06/24/2015   • Gastroesophageal reflux disease without esophagitis 06/09/2015   • Tobacco use 09/03/2014   • Obesity, Class II, BMI 35-39.9 09/03/2014   • DM II (diabetes mellitus, type II), controlled ([facility]) 01/18/2011   • Dyslipidemia 01/18/2011       Current Outpatient Prescriptions   Medication Sig Dispense Refill   • BD PEN NEEDLE DIPESH U/F 32G X 4 MM Misc USE 1 DAILY AS DIRECTED 100 Each 11   • omeprazole (PRILOSEC) 40 MG delayed-release capsule TAKE 1 CAP BY MOUTH EVERY DAY. 90 Cap 3   • Blood Glucose Monitoring Suppl Supplies Misc Test strips order: Test strips for one touch ultra meter. Sig: use bid. 100 Each 11   • Blood Glucose Monitoring Suppl Device Meter: Dispense one touch ultra meter. Sig. Use as directed for blood sugar monitoring. #1. NR. 1 Device 0   • Lancets Misc Lancets order: Lancets for one touch ultra 2 diabetic testing meter.  "Sig: use bid 100 Each 11   • metformin (GLUCOPHAGE) 1000 MG tablet Take 1 Tab by mouth 2 times a day, with meals. 180 Tab 1   • hydroCHLOROthiazide (HYDRODIURIL) 25 MG Tab Take 1 Tab by mouth every day. TAKE 1 TABLET BY MOUTH DAILY 90 Tab 3   • losartan (COZAAR) 100 MG Tab Take 1 Tab by mouth every day. TAKE 1 TAB BY MOUTH EVERY DAY. 90 Tab 3   • simvastatin (ZOCOR) 40 MG Tab Take 1 Tab by mouth every bedtime. TAKE 1 TAB BY MOUTH EVERY EVENING. 90 Tab 3   • VICTOZA 18 MG/3ML Solution Pen-injector injection INJECT 0.3 ML AS INSTRUCTED EVERY DAY. 9 PEN 3   • JARDIANCE 25 MG Tab TAKE 1 TABLET BY MOUTH EVERY DAY 90 Tab 3   • vitamin D (CHOLECALCIFEROL) 1000 UNIT TABS Take 1,000 Units by mouth every day.       • aspirin 81 MG EC tablet Take  by mouth.     • pramoxine-hydrocortisone (PROTOFOAM-HC) cream Apply 1 g to affected area(s) 3 times a day. 630 g 3   • hydrocortisone (ANUSOL-HC) 25 MG Suppos Insert 1 Suppository in rectum every 12 hours. 20 Suppository 0   • albuterol (PROVENTIL) 90 MCG/ACT AERS Inhale 2 Puffs by mouth every 6 hours as needed for Shortness of Breath. 1 Inhaler 3     No current facility-administered medications for this visit.          Allergies as of 06/14/2018   • (No Known Allergies)        ROS: Denies Chest pain, SOB, LE edema.    /70   Pulse 80   Temp 36.2 °C (97.1 °F)   Ht 1.93 m (6' 4\")   Wt (!) 131.5 kg (290 lb)   SpO2 94%   BMI 35.30 kg/m²     Physical Exam:  Gen:         Alert and oriented, No apparent distress.  Neck:        No Lymphadenopathy or Bruits.  Lungs:     Clear to auscultation bilaterally  CV:          Regular rate and rhythm. No murmurs, rubs or gallops.               Ext:          No clubbing, cyanosis, edema.      Assessment and Plan.   62 y.o. male with the following issues.    1. Controlled type 2 diabetes mellitus without complication, without long-term current use of insulin (HCC)  Optimistic with dietary changes that he will be under good control will see " back in 3 months with an office A1c.  - REFERRAL TO OPHTHALMOLOGY    2. Eye irritation  Have recommended over-the-counter eyedrops for antihistamines if not improving referral to ophthalmology.  If pain persist or he develops any blurred vision he will get seen more immediately in the emergency room.  - REFERRAL TO OPHTHALMOLOGY    3. Loose stools  No recent travel or antibiotics suspect dietary factors.  Will hold metformin for 1 week to see if it improves and then reintroduce.  If symptoms are not resolving have given orders for stool testing.  - CDIFF BY PCR; Future  - CULTURE STOOL; Future    4. Essential hypertension  Currently well controlled, Discuss diet, exercise and salt restriction.  No change to medication therapy.

## 2018-06-14 NOTE — PROGRESS NOTES
RN-CDE Note    Subjective:     Health changes since last visit/interval Hx: General health good.  He is on his last day of his fasting.  Left eye watering in the morning.    Medications (including changes made today)  Current Outpatient Prescriptions   Medication Sig Dispense Refill   • BD PEN NEEDLE DIPESH U/F 32G X 4 MM Misc USE 1 DAILY AS DIRECTED 100 Each 11   • omeprazole (PRILOSEC) 40 MG delayed-release capsule TAKE 1 CAP BY MOUTH EVERY DAY. 90 Cap 3   • Blood Glucose Monitoring Suppl Supplies Misc Test strips order: Test strips for one touch ultra meter. Sig: use bid. 100 Each 11   • Blood Glucose Monitoring Suppl Device Meter: Dispense one touch ultra meter. Sig. Use as directed for blood sugar monitoring. #1. NR. 1 Device 0   • Lancets Misc Lancets order: Lancets for one touch ultra 2 diabetic testing meter. Sig: use bid 100 Each 11   • metformin (GLUCOPHAGE) 1000 MG tablet Take 1 Tab by mouth 2 times a day, with meals. 180 Tab 1   • hydroCHLOROthiazide (HYDRODIURIL) 25 MG Tab Take 1 Tab by mouth every day. TAKE 1 TABLET BY MOUTH DAILY 90 Tab 3   • losartan (COZAAR) 100 MG Tab Take 1 Tab by mouth every day. TAKE 1 TAB BY MOUTH EVERY DAY. 90 Tab 3   • simvastatin (ZOCOR) 40 MG Tab Take 1 Tab by mouth every bedtime. TAKE 1 TAB BY MOUTH EVERY EVENING. 90 Tab 3   • VICTOZA 18 MG/3ML Solution Pen-injector injection INJECT 0.3 ML AS INSTRUCTED EVERY DAY. 9 PEN 3   • JARDIANCE 25 MG Tab TAKE 1 TABLET BY MOUTH EVERY DAY 90 Tab 3   • vitamin D (CHOLECALCIFEROL) 1000 UNIT TABS Take 1,000 Units by mouth every day.       • aspirin 81 MG EC tablet Take  by mouth.     • pramoxine-hydrocortisone (PROTOFOAM-HC) cream Apply 1 g to affected area(s) 3 times a day. 630 g 3   • hydrocortisone (ANUSOL-HC) 25 MG Suppos Insert 1 Suppository in rectum every 12 hours. 20 Suppository 0   • albuterol (PROVENTIL) 90 MCG/ACT AERS Inhale 2 Puffs by mouth every 6 hours as needed for Shortness of Breath. 1 Inhaler 3     No current  facility-administered medications for this visit.          Taking daily ASA: Yes  Taking above medications as prescribed: Yes   Patient Denies side effects of medication.    Exercise: Walking a lot.  Diet: Muffin in the morning.  Lunch is fast food.  Dinner skips 75% of time.  Drinks regular.    Health Maintenance:   Health Maintenance Topics with due status: Overdue       Topic Date Due    COLONOSCOPY 02/18/2018         DM:   Last A1c:   Lab Results   Component Value Date/Time    HBA1C 7.9 06/12/2018      A1c goal: < 7    Glucose monitoring frequency: Testing.  trend: 150-200's  Hypoglycemic episodes: no     Last Retinal Exam: 9/7/17 Provider: Cristin  Daily Foot Exam: yes  Routine Dental Exams: yes    Lab Results   Component Value Date/Time    MICROALBCALC 26.2 08/02/2017 10:17 AM    MALBCRT 31 (H) 08/29/2014 09:18 AM    MICROALBUR 3.3 08/29/2014 09:18 AM    MICRALB 38.0 08/02/2017 10:17 AM        ACR Albumin/Creatinine Ratio goal <30     Diabetic complications: none    HTN:   Blood pressure goal <140/<80 .   Currently Rx ACE/ARB: Yes    Dyslipidemia:    Lab Results   Component Value Date/Time    CHOLSTRLTOT 119 08/02/2017 10:17 AM    CHOLSTRLTOT 126 08/29/2014 09:12 AM    LDL 47 08/02/2017 10:17 AM    LDL 57 08/29/2014 09:12 AM    HDL 43 08/02/2017 10:17 AM    HDL 42 08/29/2014 09:12 AM    TRIGLYCERIDE 147 08/02/2017 10:17 AM    TRIGLYCERIDE 137 08/29/2014 09:12 AM       Lab Results   Component Value Date/Time    SODIUM 139 08/02/2017 10:17 AM    SODIUM 135 08/29/2014 09:12 AM    POTASSIUM 3.8 08/02/2017 10:17 AM    POTASSIUM 4.1 08/29/2014 09:12 AM    CHLORIDE 96 08/02/2017 10:17 AM    CHLORIDE 100 08/29/2014 09:12 AM    CO2 25 08/02/2017 10:17 AM    CO2 29 08/29/2014 09:12 AM    GLUCOSE 113 (H) 08/02/2017 10:17 AM    GLUCOSE 150 (H) 08/29/2014 09:12 AM    BUN 13 08/02/2017 10:17 AM    BUN 11 08/29/2014 09:12 AM    CREATININE 1.14 (H) 08/02/2017 10:17 AM    CREATININE 1.17 08/29/2014 09:12 AM    CREATININE 1.18  01/11/2011 12:00 AM    BUNCREATRAT 11 (L) 08/02/2017 10:17 AM    BUNCREATRAT 8 (L) 01/11/2011 12:00 AM    GLOMRATE >59 01/11/2011 12:00 AM     Lab Results   Component Value Date/Time    ALKPHOSPHAT 68 08/02/2017 10:17 AM    ALKPHOSPHAT 74 08/29/2014 09:12 AM    ASTSGOT 26 08/02/2017 10:17 AM    ASTSGOT 28 08/29/2014 09:12 AM    ALTSGPT 24 08/02/2017 10:17 AM    ALTSGPT 27 08/29/2014 09:12 AM    TBILIRUBIN 0.4 08/02/2017 10:17 AM    TBILIRUBIN 1.0 08/29/2014 09:12 AM        Currently Rx Statin: Yes      He  reports that he has been smoking Cigars.  He has never used smokeless tobacco.    Objective:     Exam:  Monofilament: done  Monofilament testing with a 10 gram force: sensation intact: intact bilaterally  Visual Inspection: Feet without maceration, ulcers, fissures.  Pedal pulses: intact bilaterally      Plan:     - Diabetic diet discussed in detail-plate method.  - Home glucose monitoring.  - He will test and log.    - He will walk for 20-30 minutes daily.  - Reviewed medications and advised to take metformin after meals to decrease   G.I.upset.   - Discussed importance of immunizations and yearly eye exams.   - Encouraged patient to attend diabetes education program.   -Educational material distributed.   - Advised daily foot exams. Educated on signs of infection.       Recommended medication changes: He would like to try stopping the regular soda and eating less carbohydrates before starting insulin.

## 2018-10-02 ENCOUNTER — OFFICE VISIT (OUTPATIENT)
Dept: MEDICAL GROUP | Facility: MEDICAL CENTER | Age: 63
End: 2018-10-02
Payer: COMMERCIAL

## 2018-10-02 VITALS
SYSTOLIC BLOOD PRESSURE: 134 MMHG | TEMPERATURE: 97.3 F | HEIGHT: 76 IN | HEART RATE: 83 BPM | BODY MASS INDEX: 34.58 KG/M2 | OXYGEN SATURATION: 95 % | DIASTOLIC BLOOD PRESSURE: 82 MMHG | WEIGHT: 283.95 LBS

## 2018-10-02 DIAGNOSIS — E11.9 CONTROLLED TYPE 2 DIABETES MELLITUS WITHOUT COMPLICATION, WITHOUT LONG-TERM CURRENT USE OF INSULIN (HCC): ICD-10-CM

## 2018-10-02 DIAGNOSIS — I10 ESSENTIAL HYPERTENSION: ICD-10-CM

## 2018-10-02 DIAGNOSIS — E78.5 DYSLIPIDEMIA: ICD-10-CM

## 2018-10-02 LAB
HBA1C MFR BLD: 7.58 % (ref ?–5.8)
INT CON NEG: NEGATIVE
INT CON POS: POSITIVE

## 2018-10-02 PROCEDURE — 83036 HEMOGLOBIN GLYCOSYLATED A1C: CPT | Performed by: INTERNAL MEDICINE

## 2018-10-02 PROCEDURE — 99214 OFFICE O/P EST MOD 30 MIN: CPT | Performed by: INTERNAL MEDICINE

## 2018-10-02 NOTE — PROGRESS NOTES
CC: Follow-up diabetes, dyslipidemia, hypertension.    HPI:   Elver presents today with the following.    1. Controlled type 2 diabetes mellitus without complication, without long-term current use of insulin ([facility])  Presents after having blood work showing an A1c going down slightly to 7.5.  His weight is down to some degree also.  He does report some diarrhea forgot to try and stop metformin to see if it improves or not.  He does seem more diligent about following up with diet and exercise.    2. Dyslipidemia  Maintain on statin without myalgias.    3. Essential hypertension  Blood pressure higher end of normal he is compliant with current medications.  Denies any chest pain or shortness of breath no edema      Patient Active Problem List    Diagnosis Date Noted   • Essential hypertension 06/24/2015   • Gastroesophageal reflux disease without esophagitis 06/09/2015   • Tobacco use 09/03/2014   • Obesity, Class II, BMI 35-39.9 09/03/2014   • DM II (diabetes mellitus, type II), controlled ([facility]) 01/18/2011   • Dyslipidemia 01/18/2011       Current Outpatient Prescriptions   Medication Sig Dispense Refill   • BD PEN NEEDLE DIPESH U/F 32G X 4 MM Misc USE 1 DAILY AS DIRECTED 100 Each 11   • omeprazole (PRILOSEC) 40 MG delayed-release capsule TAKE 1 CAP BY MOUTH EVERY DAY. 90 Cap 3   • Blood Glucose Monitoring Suppl Supplies Misc Test strips order: Test strips for one touch ultra meter. Sig: use bid. 100 Each 11   • Blood Glucose Monitoring Suppl Device Meter: Dispense one touch ultra meter. Sig. Use as directed for blood sugar monitoring. #1. NR. 1 Device 0   • Lancets Misc Lancets order: Lancets for one touch ultra 2 diabetic testing meter. Sig: use bid 100 Each 11   • metformin (GLUCOPHAGE) 1000 MG tablet Take 1 Tab by mouth 2 times a day, with meals. 180 Tab 1   • hydroCHLOROthiazide (HYDRODIURIL) 25 MG Tab Take 1 Tab by mouth every day. TAKE 1 TABLET BY MOUTH DAILY 90 Tab 3   • losartan (COZAAR) 100 MG Tab Take 1 Tab  [facility]ucker Progress Note  This is a  male born on 2017.     Patient Active Problem List   Diagnosis    Normal  (single liveborn)   [employer] Term birth of  male   [employer] PROM (premature rupture of membranes)    Need for observation and evaluation of  for sepsis       Vital Signs:  BP 76/43 Comment: map 56  Pulse 124   Temp 98.3 °F (36.8 °C)   Resp 48   SpO2 100% Comment: right foot    Birth Weight: 106 oz (3005 g)     Wt Readings from Last 3 Encounters:   No data found for Wt        %     Feeding method: Breast, Bottle    Recent Labs:   Admission on 2018   Component Date Value Ref Range Status    ABO Rh 2018 O POS   Final    Cord Blood [place] 2018 NEG   Final    WBC 2018  9.0 - 30.0 thou/mm3 Final    RBC 2018  4.80 - 6.20 mill/mm3 Final    Hemoglobin 2018  15.5 - 19.5 gm/dl Final    Hematocrit 2018* 50.0 - 60.0 % Final    MCV 2018  92.0 - 118.0 fL Final    MCH 2018* 26.0 - 33.0 pg Final    MCHC 2018* 32.2 - 35.5 gm/dl Final    RDW-CV 2018* 11.5 - 14.5 % Final    RDW-SD 2018* 35.0 - 45.0 fL Final    Platelets  369  130 - 400 thou/mm3 Final    MPV 2018  9.4 - 12.4 fL Final    Seg Neutrophils 2018  % Final    Lymphocytes 2018  % Final    Monocytes 2018  % Final    Eosinophils 2018  % Final    Basophils 2018  % Final    Platelet Estimate  ADEQUATE  Adequate Final    Segs Absolute 2018  1.5 - 11.4 thou/mm3 Final    Lymphocytes # 2018  1.7 - 11.5 thou/mm3 Final    Monocytes # 2018* 0.2 - 1.8 thou/mm3 Final    Eosinophils # 2018  0.0 - 0.4 thou/mm3 Final    Basophils # 2018* 0.0 - 0.1 thou/mm3 Final    nRBC 2018 0  /100 wbc Final    BASOPHILIA 2018 1+  Absent Final    Poikilocytes 2018 1+  Absent Final    Blood "by mouth every day. TAKE 1 TAB BY MOUTH EVERY DAY. 90 Tab 3   • simvastatin (ZOCOR) 40 MG Tab Take 1 Tab by mouth every bedtime. TAKE 1 TAB BY MOUTH EVERY EVENING. 90 Tab 3   • VICTOZA 18 MG/3ML Solution Pen-injector injection INJECT 0.3 ML AS INSTRUCTED EVERY DAY. 9 PEN 3   • JARDIANCE 25 MG Tab TAKE 1 TABLET BY MOUTH EVERY DAY 90 Tab 3   • hydrocortisone (ANUSOL-HC) 25 MG Suppos Insert 1 Suppository in rectum every 12 hours. 20 Suppository 0   • vitamin D (CHOLECALCIFEROL) 1000 UNIT TABS Take 1,000 Units by mouth every day.       • aspirin 81 MG EC tablet Take  by mouth.     • pramoxine-hydrocortisone (PROTOFOAM-HC) cream Apply 1 g to affected area(s) 3 times a day. (Patient not taking: Reported on 10/2/2018) 630 g 3   • albuterol (PROVENTIL) 90 MCG/ACT AERS Inhale 2 Puffs by mouth every 6 hours as needed for Shortness of Breath. (Patient not taking: Reported on 10/2/2018) 1 Inhaler 3     No current facility-administered medications for this visit.          Allergies as of 10/02/2018   • (No Known Allergies)        ROS: Denies Chest pain, SOB, LE edema.    /82   Pulse 83   Temp 36.3 °C (97.3 °F)   Ht 1.93 m (6' 4\")   Wt (!) 128.8 kg (283 lb 15.2 oz)   SpO2 95%   BMI 34.56 kg/m²     Physical Exam:  Gen:         Alert and oriented, No apparent distress.  Neck:        No Lymphadenopathy or Bruits.  Lungs:     Clear to auscultation bilaterally  CV:          Regular rate and rhythm. No murmurs, rubs or gallops.               Ext:          No clubbing, cyanosis, edema.      Assessment and Plan.   63 y.o. male with the following issues.    1. Controlled type 2 diabetes mellitus without complication, without long-term current use of insulin (HCC)  Discussion about diet exercise he will try holding metformin to see if diarrhea improves if he cannot tolerate medication well follow-up  - POCT  A1C  - HEMOGLOBIN A1C; Future  - MICROALBUMIN CREAT RATIO URINE; Future    2. Dyslipidemia  Recheck cholesterol  - COMP " METABOLIC PANEL; Future  - LIPID PROFILE; Future    3. Essential hypertension  Currently well controlled, Discuss diet, exercise and salt restriction.  No change to medication therapy.

## 2018-12-02 RX ORDER — EMPAGLIFLOZIN 25 MG/1
TABLET, FILM COATED ORAL
Qty: 90 TAB | Refills: 3 | Status: SHIPPED | OUTPATIENT
Start: 2018-12-02 | End: 2019-11-04 | Stop reason: SDUPTHER

## 2018-12-04 RX ORDER — LIRAGLUTIDE 6 MG/ML
INJECTION SUBCUTANEOUS
Qty: 9 PEN | Refills: 2 | Status: SHIPPED | OUTPATIENT
Start: 2018-12-04 | End: 2019-12-23 | Stop reason: SDUPTHER

## 2019-03-31 DIAGNOSIS — E78.5 DYSLIPIDEMIA: ICD-10-CM

## 2019-03-31 RX ORDER — SIMVASTATIN 40 MG
TABLET ORAL
Qty: 90 TAB | Refills: 3 | Status: SHIPPED | OUTPATIENT
Start: 2019-03-31 | End: 2020-04-07

## 2019-04-07 DIAGNOSIS — K21.9 GASTROESOPHAGEAL REFLUX DISEASE WITHOUT ESOPHAGITIS: ICD-10-CM

## 2019-04-08 RX ORDER — OMEPRAZOLE 40 MG/1
40 CAPSULE, DELAYED RELEASE ORAL
Qty: 90 CAP | Refills: 0 | Status: SHIPPED | OUTPATIENT
Start: 2019-04-08 | End: 2019-07-23 | Stop reason: SDUPTHER

## 2019-04-15 RX ORDER — LOSARTAN POTASSIUM 100 MG/1
100 TABLET ORAL
Qty: 90 EACH | Refills: 1 | Status: SHIPPED | OUTPATIENT
Start: 2019-04-15 | End: 2019-11-04 | Stop reason: SDUPTHER

## 2019-04-18 DIAGNOSIS — I10 ESSENTIAL HYPERTENSION: ICD-10-CM

## 2019-04-18 RX ORDER — HYDROCHLOROTHIAZIDE 25 MG/1
25 TABLET ORAL DAILY
Qty: 90 TAB | Refills: 0 | Status: SHIPPED | OUTPATIENT
Start: 2019-04-18 | End: 2019-07-31 | Stop reason: SDUPTHER

## 2019-04-18 NOTE — TELEPHONE ENCOUNTER
Was the patient seen in the last year in this department? Yes    Does patient have an active prescription for medications requested? Yes    Received Request Via: Patient.

## 2019-07-03 LAB — HBA1C MFR BLD: 8.2 % (ref 0–5.6)

## 2019-07-10 ENCOUNTER — OFFICE VISIT (OUTPATIENT)
Dept: MEDICAL GROUP | Facility: MEDICAL CENTER | Age: 64
End: 2019-07-10
Payer: COMMERCIAL

## 2019-07-10 VITALS
WEIGHT: 295 LBS | OXYGEN SATURATION: 98 % | DIASTOLIC BLOOD PRESSURE: 76 MMHG | BODY MASS INDEX: 35.92 KG/M2 | SYSTOLIC BLOOD PRESSURE: 134 MMHG | TEMPERATURE: 97.9 F | HEART RATE: 78 BPM | HEIGHT: 76 IN

## 2019-07-10 DIAGNOSIS — N52.01 ERECTILE DYSFUNCTION DUE TO ARTERIAL INSUFFICIENCY: ICD-10-CM

## 2019-07-10 DIAGNOSIS — I10 ESSENTIAL HYPERTENSION: ICD-10-CM

## 2019-07-10 DIAGNOSIS — E11.9 CONTROLLED TYPE 2 DIABETES MELLITUS WITHOUT COMPLICATION, WITHOUT LONG-TERM CURRENT USE OF INSULIN (HCC): ICD-10-CM

## 2019-07-10 PROCEDURE — 99213 OFFICE O/P EST LOW 20 MIN: CPT | Performed by: INTERNAL MEDICINE

## 2019-07-10 RX ORDER — SILDENAFIL 100 MG/1
100 TABLET, FILM COATED ORAL PRN
Qty: 10 TAB | Refills: 3 | Status: SHIPPED | OUTPATIENT
Start: 2019-07-10

## 2019-07-10 ASSESSMENT — PATIENT HEALTH QUESTIONNAIRE - PHQ9: CLINICAL INTERPRETATION OF PHQ2 SCORE: 0

## 2019-07-10 NOTE — PROGRESS NOTES
CC: Follow-up diabetes, hypertension, complaining of erectile issues.                                                                                                                                  HPI:   Elver presents today with the following.    1. Erectile dysfunction due to arterial insufficiency  Reports that he has had progressive difficulty with erectile issues that it may be related to medications.  Denies any chest pain or shortness of breath with activity.  He does have issues with libido as well again as erection.    2. Controlled type 2 diabetes mellitus without complication, without long-term current use of insulin ([facility])  Diabetes poorly controlled has gone up to 8.2 weight is gone up 12 pounds since last visit.  He is compliant with medication denying any hypoglycemia    3. Essential hypertension  Mildly elevated in office today reports better readings at home.      Patient Active Problem List    Diagnosis Date Noted   • Essential hypertension 06/24/2015   • Gastroesophageal reflux disease without esophagitis 06/09/2015   • Tobacco use 09/03/2014   • Obesity, Class II, BMI 35-39.9 09/03/2014   • DM II (diabetes mellitus, type II), controlled ([facility]) 01/18/2011   • Dyslipidemia 01/18/2011       Current Outpatient Prescriptions   Medication Sig Dispense Refill   • sildenafil citrate (VIAGRA) 100 MG tablet Take 1 Tab by mouth as needed for Erectile Dysfunction. 10 Tab 3   • metformin (GLUCOPHAGE) 1000 MG tablet Take 1 Tab by mouth 2 times a day. 180 Tab 3   • hydroCHLOROthiazide (HYDRODIURIL) 25 MG Tab TAKE 1 TAB BY MOUTH EVERY DAY. TAKE 1 TABLET BY MOUTH DAILY 90 Tab 0   • losartan (COZAAR) 100 MG Tab TAKE 1 TAB BY MOUTH EVERY DAY. TAKE 1 TAB BY MOUTH EVERY DAY. 90 Each 1   • simvastatin (ZOCOR) 40 MG Tab TAKE 1 TAB BY MOUTH EVERY BEDTIME. 90 Tab 3   • VICTOZA 18 MG/3ML Solution Pen-injector injection INJECT 0.3 ML SUBCUTANEOUSLY AS INSTRUCTED EVERY DAY. 9 PEN 2   • JARDIANCE 25 MG Tab TAKE 1  "TABLET BY MOUTH EVERY DAY 90 Tab 3   • BD PEN NEEDLE DIPESH U/F 32G X 4 MM Misc USE 1 DAILY AS DIRECTED 100 Each 11   • omeprazole (PRILOSEC) 40 MG delayed-release capsule TAKE 1 CAP BY MOUTH EVERY DAY. 90 Cap 3   • Blood Glucose Monitoring Suppl Supplies Misc Test strips order: Test strips for one touch ultra meter. Sig: use bid. 100 Each 11   • Blood Glucose Monitoring Suppl Device Meter: Dispense one touch ultra meter. Sig. Use as directed for blood sugar monitoring. #1. NR. 1 Device 0   • Lancets Misc Lancets order: Lancets for one touch ultra 2 diabetic testing meter. Sig: use bid 100 Each 11   • pramoxine-hydrocortisone (PROTOFOAM-HC) cream Apply 1 g to affected area(s) 3 times a day. 630 g 3   • hydrocortisone (ANUSOL-HC) 25 MG Suppos Insert 1 Suppository in rectum every 12 hours. 20 Suppository 0   • vitamin D (CHOLECALCIFEROL) 1000 UNIT TABS Take 1,000 Units by mouth every day.       • albuterol (PROVENTIL) 90 MCG/ACT AERS Inhale 2 Puffs by mouth every 6 hours as needed for Shortness of Breath. 1 Inhaler 3   • aspirin 81 MG EC tablet Take  by mouth.     • omeprazole (PRILOSEC) 40 MG delayed-release capsule TAKE 1 CAP BY MOUTH EVERY DAY. (Patient not taking: Reported on 7/10/2019) 90 Cap 0     No current facility-administered medications for this visit.          Allergies as of 07/10/2019   • (No Known Allergies)        ROS: Denies Chest pain, SOB, LE edema.    /76 (BP Location: Right arm, Patient Position: Sitting)   Pulse 78   Temp 36.6 °C (97.9 °F)   Ht 1.93 m (6' 4\")   Wt (!) 133.8 kg (295 lb)   SpO2 98%   BMI 35.91 kg/m²     Physical Exam:  Gen:         Alert and oriented, No apparent distress.  Neck:        No Lymphadenopathy or Bruits.  Lungs:     Clear to auscultation bilaterally  CV:          Regular rate and rhythm. No murmurs, rubs or gallops.               Ext:          No clubbing, cyanosis, edema.      Assessment and Plan.   63 y.o. male with the following issues.    1. Erectile " dysfunction due to arterial insufficiency  Discussion about the precursor to heart disease starting on Viagra cautioned about side effects.  - sildenafil citrate (VIAGRA) 100 MG tablet; Take 1 Tab by mouth as needed for Erectile Dysfunction.  Dispense: 10 Tab; Refill: 3    2. Controlled type 2 diabetes mellitus without complication, without long-term current use of insulin (HCC)  No changes to medication recheck in 3 months if persistently elevated may consider adding long-acting insulin    3. Essential hypertension  Discussion about diet exercise weight loss recheck 3 months

## 2019-07-23 DIAGNOSIS — K21.9 GASTROESOPHAGEAL REFLUX DISEASE WITHOUT ESOPHAGITIS: ICD-10-CM

## 2019-07-23 RX ORDER — OMEPRAZOLE 40 MG/1
CAPSULE, DELAYED RELEASE ORAL
Qty: 90 CAP | Refills: 2 | Status: SHIPPED | OUTPATIENT
Start: 2019-07-23

## 2019-07-31 DIAGNOSIS — I10 ESSENTIAL HYPERTENSION: ICD-10-CM

## 2019-07-31 RX ORDER — PEN NEEDLE, DIABETIC 32GX 5/32"
NEEDLE, DISPOSABLE MISCELLANEOUS
Qty: 100 EACH | Refills: 11 | Status: SHIPPED | OUTPATIENT
Start: 2019-07-31 | End: 2020-10-29 | Stop reason: SDUPTHER

## 2019-08-01 RX ORDER — HYDROCHLOROTHIAZIDE 25 MG/1
TABLET ORAL
Qty: 90 TAB | Refills: 3 | Status: SHIPPED | OUTPATIENT
Start: 2019-08-01 | End: 2020-07-22

## 2019-11-04 ENCOUNTER — OFFICE VISIT (OUTPATIENT)
Dept: MEDICAL GROUP | Facility: MEDICAL CENTER | Age: 64
End: 2019-11-04
Payer: COMMERCIAL

## 2019-11-04 VITALS
HEIGHT: 76 IN | SYSTOLIC BLOOD PRESSURE: 126 MMHG | WEIGHT: 292 LBS | HEART RATE: 77 BPM | OXYGEN SATURATION: 96 % | BODY MASS INDEX: 35.56 KG/M2 | DIASTOLIC BLOOD PRESSURE: 70 MMHG | TEMPERATURE: 97 F

## 2019-11-04 DIAGNOSIS — Z12.5 SCREENING PSA (PROSTATE SPECIFIC ANTIGEN): ICD-10-CM

## 2019-11-04 DIAGNOSIS — E78.5 DYSLIPIDEMIA: ICD-10-CM

## 2019-11-04 DIAGNOSIS — K21.9 GASTROESOPHAGEAL REFLUX DISEASE WITHOUT ESOPHAGITIS: ICD-10-CM

## 2019-11-04 DIAGNOSIS — E11.9 CONTROLLED TYPE 2 DIABETES MELLITUS WITHOUT COMPLICATION, WITHOUT LONG-TERM CURRENT USE OF INSULIN (HCC): ICD-10-CM

## 2019-11-04 DIAGNOSIS — Z23 NEED FOR VACCINATION: ICD-10-CM

## 2019-11-04 DIAGNOSIS — Z11.59 NEED FOR HEPATITIS C SCREENING TEST: ICD-10-CM

## 2019-11-04 LAB
HBA1C MFR BLD: 7.9 % (ref 0–5.6)
INT CON NEG: NEGATIVE
INT CON POS: POSITIVE
RETINAL SCREEN: NEGATIVE

## 2019-11-04 PROCEDURE — 83036 HEMOGLOBIN GLYCOSYLATED A1C: CPT | Performed by: INTERNAL MEDICINE

## 2019-11-04 PROCEDURE — 90686 IIV4 VACC NO PRSV 0.5 ML IM: CPT | Performed by: INTERNAL MEDICINE

## 2019-11-04 PROCEDURE — 90471 IMMUNIZATION ADMIN: CPT | Performed by: INTERNAL MEDICINE

## 2019-11-04 PROCEDURE — 92250 FUNDUS PHOTOGRAPHY W/I&R: CPT | Mod: TC | Performed by: INTERNAL MEDICINE

## 2019-11-04 PROCEDURE — 99214 OFFICE O/P EST MOD 30 MIN: CPT | Mod: 25 | Performed by: INTERNAL MEDICINE

## 2019-11-04 RX ORDER — OMEPRAZOLE 40 MG/1
40 CAPSULE, DELAYED RELEASE ORAL
Qty: 90 CAP | Refills: 3 | Status: SHIPPED | OUTPATIENT
Start: 2019-11-04

## 2019-11-04 RX ORDER — PIOGLITAZONEHYDROCHLORIDE 30 MG/1
30 TABLET ORAL DAILY
Qty: 90 TAB | Refills: 3 | Status: SHIPPED | OUTPATIENT
Start: 2019-11-04

## 2019-11-04 RX ORDER — LOSARTAN POTASSIUM 100 MG/1
100 TABLET ORAL
Qty: 90 EACH | Refills: 1 | Status: SHIPPED | OUTPATIENT
Start: 2019-11-04 | End: 2020-04-27

## 2019-11-04 NOTE — PROGRESS NOTES
CC: Follow-up diabetes.                                                                                                                                      HPI:   Elver presents today with the following.    1. Controlled type 2 diabetes mellitus without complication, without long-term current use of insulin ([facility])  Presents having an A1c checked after 3 months down to 7.9 from 8.2.  Tolerating medications well he has not lost significant weight.  Does report compliance to meds but is eating more than he knows he should.  Denies any symptoms of hypoglycemia no chest pain or shortness of breath no other physical complaints today.      Patient Active Problem List    Diagnosis Date Noted   • Essential hypertension 06/24/2015   • Gastroesophageal reflux disease without esophagitis 06/09/2015   • Tobacco use 09/03/2014   • Obesity, Class II, BMI 35-39.9 09/03/2014   • DM II (diabetes mellitus, type II), controlled ([facility]) 01/18/2011   • Dyslipidemia 01/18/2011       Current Outpatient Medications   Medication Sig Dispense Refill   • Liraglutide -Weight Management (SAXENDA) 18 MG/3ML Solution Pen-injector Inject 3 mg as instructed every day. 3 PEN 11   • pioglitazone (ACTOS) 30 MG Tab Take 1 Tab by mouth every day. 90 Tab 3   • losartan (COZAAR) 100 MG Tab Take 1 Tab by mouth every day. TAKE 1 TAB BY MOUTH EVERY DAY. 90 Each 1   • Empagliflozin (JARDIANCE) 25 MG Tab Take 1 Tab by mouth every day. 90 Tab 3   • omeprazole (PRILOSEC) 40 MG delayed-release capsule Take 1 Cap by mouth every day. 90 Cap 3   • hydroCHLOROthiazide (HYDRODIURIL) 25 MG Tab TAKE 1 TAB BY MOUTH EVERY DAY. 90 Tab 3   • BD PEN NEEDLE DIPESH U/F USE 1 DAILY AS DIRECTED 100 Each 11   • omeprazole (PRILOSEC) 40 MG delayed-release capsule TAKE 1 CAPSULE BY MOUTH EVERY DAY 90 Cap 2   • sildenafil citrate (VIAGRA) 100 MG tablet Take 1 Tab by mouth as needed for Erectile Dysfunction. 10 Tab 3   • metformin (GLUCOPHAGE) 1000 MG tablet Take 1 Tab by mouth 2  "times a day. 180 Tab 3   • simvastatin (ZOCOR) 40 MG Tab TAKE 1 TAB BY MOUTH EVERY BEDTIME. 90 Tab 3   • VICTOZA 18 MG/3ML Solution Pen-injector injection INJECT 0.3 ML SUBCUTANEOUSLY AS INSTRUCTED EVERY DAY. 9 PEN 2   • Blood Glucose Monitoring Suppl Supplies Misc Test strips order: Test strips for one touch ultra meter. Sig: use bid. 100 Each 11   • Blood Glucose Monitoring Suppl Device Meter: Dispense one touch ultra meter. Sig. Use as directed for blood sugar monitoring. #1. NR. 1 Device 0   • Lancets Misc Lancets order: Lancets for one touch ultra 2 diabetic testing meter. Sig: use bid 100 Each 11   • pramoxine-hydrocortisone (PROTOFOAM-HC) cream Apply 1 g to affected area(s) 3 times a day. 630 g 3   • hydrocortisone (ANUSOL-HC) 25 MG Suppos Insert 1 Suppository in rectum every 12 hours. 20 Suppository 0   • vitamin D (CHOLECALCIFEROL) 1000 UNIT TABS Take 1,000 Units by mouth every day.       • albuterol (PROVENTIL) 90 MCG/ACT AERS Inhale 2 Puffs by mouth every 6 hours as needed for Shortness of Breath. 1 Inhaler 3   • aspirin 81 MG EC tablet Take  by mouth.       No current facility-administered medications for this visit.          Allergies as of 11/04/2019   • (No Known Allergies)        ROS: Denies Chest pain, SOB, LE edema.    /70 (BP Location: Right arm, Patient Position: Sitting)   Pulse 77   Temp 36.1 °C (97 °F)   Ht 1.93 m (6' 4\")   Wt (!) 132.5 kg (292 lb)   SpO2 96%   BMI 35.54 kg/m²     Physical Exam:  Gen:         Alert and oriented, No apparent distress.  Neck:        No Lymphadenopathy or Bruits.  Lungs:     Clear to auscultation bilaterally  CV:          Regular rate and rhythm. No murmurs, rubs or gallops.               Ext:          No clubbing, cyanosis, edema.      Assessment and Plan.   64 y.o. male with the following issues.    1. Controlled type 2 diabetes mellitus without complication, without long-term current use of insulin (HCC)  Setting on high-dose Victoza 3 g/day " cautioned about side effects of medication.  If med is not affordable will continue at the 1.8 mg dose and add Actos at 30 mg.  Discussed side effect profiles of both medications.  - POCT Hemoglobin A1C  - HEMOGLOBIN A1C; Future  - MICROALBUMIN CREAT RATIO URINE; Future  - pioglitazone (ACTOS) 30 MG Tab; Take 1 Tab by mouth every day.  Dispense: 90 Tab; Refill: 3  - POCT Retinal Eye Exam    2. Dyslipidemia  Continue statin  - Comp Metabolic Panel; Future  - Lipid Profile; Future    3. Need for hepatitis C screening test    - HEP C VIRUS ANTIBODY; Future    4. Screening PSA (prostate specific antigen)    - PROSTATE SPECIFIC AG SCREENING; Future    5. Need for vaccination    - Influenza Vaccine Quad Injection (PF)

## 2020-02-10 ENCOUNTER — OFFICE VISIT (OUTPATIENT)
Dept: MEDICAL GROUP | Facility: MEDICAL CENTER | Age: 65
End: 2020-02-10
Payer: COMMERCIAL

## 2020-02-10 VITALS
BODY MASS INDEX: 36.77 KG/M2 | DIASTOLIC BLOOD PRESSURE: 74 MMHG | OXYGEN SATURATION: 96 % | HEIGHT: 76 IN | WEIGHT: 302 LBS | TEMPERATURE: 97.1 F | SYSTOLIC BLOOD PRESSURE: 134 MMHG | HEART RATE: 74 BPM

## 2020-02-10 DIAGNOSIS — Z12.11 SCREEN FOR COLON CANCER: Primary | ICD-10-CM

## 2020-02-10 DIAGNOSIS — I10 ESSENTIAL HYPERTENSION: ICD-10-CM

## 2020-02-10 DIAGNOSIS — E11.9 CONTROLLED TYPE 2 DIABETES MELLITUS WITHOUT COMPLICATION, WITHOUT LONG-TERM CURRENT USE OF INSULIN (HCC): ICD-10-CM

## 2020-02-10 DIAGNOSIS — E66.9 OBESITY, CLASS II, BMI 35-39.9: ICD-10-CM

## 2020-02-10 LAB
HBA1C MFR BLD: 8.1 % (ref 0–5.6)
INT CON NEG: NEGATIVE
INT CON POS: POSITIVE

## 2020-02-10 PROCEDURE — 99214 OFFICE O/P EST MOD 30 MIN: CPT | Performed by: INTERNAL MEDICINE

## 2020-02-10 PROCEDURE — 83036 HEMOGLOBIN GLYCOSYLATED A1C: CPT | Performed by: INTERNAL MEDICINE

## 2020-02-10 ASSESSMENT — PATIENT HEALTH QUESTIONNAIRE - PHQ9: CLINICAL INTERPRETATION OF PHQ2 SCORE: 0

## 2020-02-10 NOTE — PROGRESS NOTES
CC: Diabetes, hypertension, obesity.                                                                                                                                      HPI:   Elver presents today with the following.    1. Controlled type 2 diabetes mellitus without complication, without long-term current use of insulin ([facility])  Presents reporting his diet was not doing well over the last few months weight has gone up approximately 10 pounds A1c also climbing to 8.  No sign of hypoglycemia not checking sugars regularly.    2. Essential hypertension  Slightly elevated today reports compliance of blood pressure medication.    3. Obesity, Class II, BMI 35-39.9  As mentioned above weight is elevated despite Victoza.  Gave a prescription for once weekly medications but cannot afford because they were not covered by his insurance    4. Screen for colon cancer  Has not had a colonoscopy reports will be tough for him to do so he is willing to do a stool test      Patient Active Problem List    Diagnosis Date Noted   • Essential hypertension 06/24/2015   • Gastroesophageal reflux disease without esophagitis 06/09/2015   • Tobacco use 09/03/2014   • Obesity, Class II, BMI 35-39.9 09/03/2014   • DM II (diabetes mellitus, type II), controlled ([facility]) 01/18/2011   • Dyslipidemia 01/18/2011       Current Outpatient Medications   Medication Sig Dispense Refill   • insulin glargine (LANTUS SOLOSTAR) 100 UNIT/ML Solution Pen-injector injection Inject 10 Units as instructed every evening. 5 PEN 11   • Insulin Pen Needle (B-D UF III MINI PEN NEEDLES) 31G X 5 MM Misc 1 Each by Does not apply route every day. 100 Each 11   • Empagliflozin (JARDIANCE) 25 MG Tab Take 1 Tab by mouth every day. 90 Tab 3   • liraglutide (VICTOZA) 18 MG/3ML Solution Pen-injector Inject 1.8 mg as instructed every day. 9 PEN 2   • pioglitazone (ACTOS) 30 MG Tab Take 1 Tab by mouth every day. 90 Tab 3   • losartan (COZAAR) 100 MG Tab Take 1 Tab by mouth  "every day. TAKE 1 TAB BY MOUTH EVERY DAY. 90 Each 1   • omeprazole (PRILOSEC) 40 MG delayed-release capsule Take 1 Cap by mouth every day. 90 Cap 3   • hydroCHLOROthiazide (HYDRODIURIL) 25 MG Tab TAKE 1 TAB BY MOUTH EVERY DAY. 90 Tab 3   • BD PEN NEEDLE DIPESH U/F USE 1 DAILY AS DIRECTED 100 Each 11   • omeprazole (PRILOSEC) 40 MG delayed-release capsule TAKE 1 CAPSULE BY MOUTH EVERY DAY 90 Cap 2   • sildenafil citrate (VIAGRA) 100 MG tablet Take 1 Tab by mouth as needed for Erectile Dysfunction. 10 Tab 3   • metformin (GLUCOPHAGE) 1000 MG tablet Take 1 Tab by mouth 2 times a day. 180 Tab 3   • simvastatin (ZOCOR) 40 MG Tab TAKE 1 TAB BY MOUTH EVERY BEDTIME. 90 Tab 3   • Blood Glucose Monitoring Suppl Supplies Misc Test strips order: Test strips for one touch ultra meter. Sig: use bid. 100 Each 11   • Blood Glucose Monitoring Suppl Device Meter: Dispense one touch ultra meter. Sig. Use as directed for blood sugar monitoring. #1. NR. 1 Device 0   • Lancets Misc Lancets order: Lancets for one touch ultra 2 diabetic testing meter. Sig: use bid 100 Each 11   • pramoxine-hydrocortisone (PROTOFOAM-HC) cream Apply 1 g to affected area(s) 3 times a day. 630 g 3   • hydrocortisone (ANUSOL-HC) 25 MG Suppos Insert 1 Suppository in rectum every 12 hours. 20 Suppository 0   • vitamin D (CHOLECALCIFEROL) 1000 UNIT TABS Take 1,000 Units by mouth every day.       • albuterol (PROVENTIL) 90 MCG/ACT AERS Inhale 2 Puffs by mouth every 6 hours as needed for Shortness of Breath. 1 Inhaler 3   • aspirin 81 MG EC tablet Take  by mouth.       No current facility-administered medications for this visit.          Allergies as of 02/10/2020   • (No Known Allergies)        ROS: Denies Chest pain, SOB, LE edema.    /74 (BP Location: Right arm, Patient Position: Sitting)   Pulse 74   Temp 36.2 °C (97.1 °F)   Ht 1.93 m (6' 4\")   Wt (!) 137 kg (302 lb)   SpO2 96%   BMI 36.76 kg/m²     Physical Exam:  Gen:         Alert and oriented, No " apparent distress.  Neck:        No Lymphadenopathy or Bruits.  Lungs:     Clear to auscultation bilaterally  CV:          Regular rate and rhythm. No murmurs, rubs or gallops.               Ext:          No clubbing, cyanosis, edema.      Assessment and Plan.   64 y.o. male with the following issues.    1. Controlled type 2 diabetes mellitus without complication, without long-term current use of insulin ([facility])  Adding insulin at a very low-dose 10 units self titration log every 3 days will increase by 2 units.  We will see back in 1 month to titrate insulin further.  - POCT Hemoglobin A1C  - Diabetic Monofilament Lower Extremity Exam    2. Essential hypertension  Currently well controlled, Discuss diet, exercise and salt restriction.  No change to medication therapy.    3. Obesity, Class II, BMI 35-39.9  Succussion about weight loss  - Patient identified as having weight management issue.  Appropriate orders and counseling given.    4. Screen for colon cancer    - COLOGUARD COLON CANCER SCREENING

## 2020-04-03 RX ORDER — INSULIN GLARGINE 100 [IU]/ML
22 INJECTION, SOLUTION SUBCUTANEOUS EVERY EVENING
Qty: 5 PEN | Refills: 11 | Status: SHIPPED | OUTPATIENT
Start: 2020-04-03 | End: 2020-05-21

## 2020-04-07 DIAGNOSIS — E78.5 DYSLIPIDEMIA: ICD-10-CM

## 2020-04-07 RX ORDER — SIMVASTATIN 40 MG
TABLET ORAL
Qty: 30 TAB | Refills: 11 | Status: SHIPPED | OUTPATIENT
Start: 2020-04-07

## 2020-04-27 RX ORDER — LOSARTAN POTASSIUM 100 MG/1
TABLET ORAL
Qty: 30 TAB | Refills: 5 | Status: SHIPPED | OUTPATIENT
Start: 2020-04-27 | End: 2020-09-02

## 2020-05-21 RX ORDER — INSULIN GLARGINE 100 [IU]/ML
22 INJECTION, SOLUTION SUBCUTANEOUS EVERY EVENING
Qty: 15 PEN | Refills: 3 | Status: SHIPPED | OUTPATIENT
Start: 2020-05-21 | End: 2020-06-30 | Stop reason: SDUPTHER

## 2020-06-30 ENCOUNTER — OFFICE VISIT (OUTPATIENT)
Dept: MEDICAL GROUP | Facility: MEDICAL CENTER | Age: 65
End: 2020-06-30
Payer: COMMERCIAL

## 2020-06-30 VITALS
OXYGEN SATURATION: 97 % | HEART RATE: 77 BPM | SYSTOLIC BLOOD PRESSURE: 132 MMHG | WEIGHT: 313 LBS | BODY MASS INDEX: 38.11 KG/M2 | TEMPERATURE: 97.3 F | DIASTOLIC BLOOD PRESSURE: 74 MMHG | HEIGHT: 76 IN

## 2020-06-30 DIAGNOSIS — Z12.5 SCREENING PSA (PROSTATE SPECIFIC ANTIGEN): ICD-10-CM

## 2020-06-30 DIAGNOSIS — E78.5 DYSLIPIDEMIA: ICD-10-CM

## 2020-06-30 DIAGNOSIS — E11.9 CONTROLLED TYPE 2 DIABETES MELLITUS WITHOUT COMPLICATION, WITHOUT LONG-TERM CURRENT USE OF INSULIN (HCC): ICD-10-CM

## 2020-06-30 DIAGNOSIS — Z11.59 NEED FOR HEPATITIS C SCREENING TEST: ICD-10-CM

## 2020-06-30 DIAGNOSIS — I10 ESSENTIAL HYPERTENSION: ICD-10-CM

## 2020-06-30 LAB
HBA1C MFR BLD: 6.4 % (ref 0–5.6)
INT CON NEG: NEGATIVE
INT CON POS: POSITIVE

## 2020-06-30 PROCEDURE — 99214 OFFICE O/P EST MOD 30 MIN: CPT | Performed by: INTERNAL MEDICINE

## 2020-06-30 PROCEDURE — 83036 HEMOGLOBIN GLYCOSYLATED A1C: CPT | Performed by: INTERNAL MEDICINE

## 2020-06-30 RX ORDER — INSULIN GLARGINE 100 [IU]/ML
22 INJECTION, SOLUTION SUBCUTANEOUS EVERY EVENING
Qty: 15 PEN | Refills: 3 | Status: SHIPPED | OUTPATIENT
Start: 2020-06-30

## 2020-06-30 NOTE — PROGRESS NOTES
CC: Follow-up diabetes, dyslipidemia, hypertension.                                                                                                                                      HPI:   Elver presents today with the following.    1. Controlled type 2 diabetes mellitus without complication, without long-term current use of insulin ([facility])  Presents last A1c of 8 maintained on medication has now come down to 6.4.  Denying any current hypoglycemia however weight has gone up significantly.  He is trying to work with diet and exercise he has been less active because of the pandemic.    2. Dyslipidemia  19 on statin without myalgias.    3. Essential hypertension  Well-controlled on current regimen denying any dizziness.        Patient Active Problem List    Diagnosis Date Noted   • Essential hypertension 06/24/2015   • Gastroesophageal reflux disease without esophagitis 06/09/2015   • Tobacco use 09/03/2014   • Obesity, Class II, BMI 35-39.9 09/03/2014   • DM II (diabetes mellitus, type II), controlled ([facility]) 01/18/2011   • Dyslipidemia 01/18/2011       Current Outpatient Medications   Medication Sig Dispense Refill   • insulin glargine (LANTUS SOLOSTAR) 100 UNIT/ML Solution Pen-injector injection Inject 22 Units as instructed every evening. 15 PEN 3   • metformin (GLUCOPHAGE) 1000 MG tablet Take 1 Tab by mouth 2 times a day. 180 Tab 3   • losartan (COZAAR) 100 MG Tab TAKE 1 TABLET BY MOUTH EVERY DAY 30 Tab 5   • simvastatin (ZOCOR) 40 MG Tab TAKE 1 TABLET BY MOUTH EVERYDAY AT BEDTIME 30 Tab 11   • Insulin Pen Needle (B-D UF III MINI PEN NEEDLES) 31G X 5 MM Misc 1 Each by Does not apply route every day. 100 Each 11   • Empagliflozin (JARDIANCE) 25 MG Tab Take 1 Tab by mouth every day. 90 Tab 3   • liraglutide (VICTOZA) 18 MG/3ML Solution Pen-injector Inject 1.8 mg as instructed every day. 9 PEN 2   • pioglitazone (ACTOS) 30 MG Tab Take 1 Tab by mouth every day. 90 Tab 3   • omeprazole (PRILOSEC) 40 MG  "delayed-release capsule Take 1 Cap by mouth every day. 90 Cap 3   • hydroCHLOROthiazide (HYDRODIURIL) 25 MG Tab TAKE 1 TAB BY MOUTH EVERY DAY. 90 Tab 3   • BD PEN NEEDLE DIPESH U/F USE 1 DAILY AS DIRECTED 100 Each 11   • omeprazole (PRILOSEC) 40 MG delayed-release capsule TAKE 1 CAPSULE BY MOUTH EVERY DAY 90 Cap 2   • sildenafil citrate (VIAGRA) 100 MG tablet Take 1 Tab by mouth as needed for Erectile Dysfunction. 10 Tab 3   • Blood Glucose Monitoring Suppl Supplies Misc Test strips order: Test strips for one touch ultra meter. Sig: use bid. 100 Each 11   • Blood Glucose Monitoring Suppl Device Meter: Dispense one touch ultra meter. Sig. Use as directed for blood sugar monitoring. #1. NR. 1 Device 0   • Lancets Misc Lancets order: Lancets for one touch ultra 2 diabetic testing meter. Sig: use bid 100 Each 11   • pramoxine-hydrocortisone (PROTOFOAM-HC) cream Apply 1 g to affected area(s) 3 times a day. 630 g 3   • vitamin D (CHOLECALCIFEROL) 1000 UNIT TABS Take 1,000 Units by mouth every day.       • albuterol (PROVENTIL) 90 MCG/ACT AERS Inhale 2 Puffs by mouth every 6 hours as needed for Shortness of Breath. 1 Inhaler 3   • aspirin 81 MG EC tablet Take  by mouth.       No current facility-administered medications for this visit.          Allergies as of 06/30/2020   • (No Known Allergies)        ROS: Denies Chest pain, SOB, LE edema.    /74 (BP Location: Right arm, Patient Position: Sitting)   Pulse 77   Temp 36.3 °C (97.3 °F)   Ht 1.93 m (6' 4\")   Wt (!) 142 kg (313 lb)   SpO2 97%   BMI 38.10 kg/m²     Physical Exam:  Gen:         Alert and oriented, No apparent distress.  Neck:        No Lymphadenopathy or Bruits.  Lungs:     Clear to auscultation bilaterally  CV:          Regular rate and rhythm. No murmurs, rubs or gallops.               Ext:          No clubbing, cyanosis, edema.      Assessment and Plan.   64 y.o. male with the following issues.    1. Controlled type 2 diabetes mellitus without " complication, without long-term current use of insulin ([facility])  Currently well controlled no changes. Discussed diet and exercise recheck A1c in 6 months. Reminded about yearly eye exam.  - POCT Hemoglobin A1C  - MICROALBUMIN CREAT RATIO URINE; Future  - HEMOGLOBIN A1C; Future  - metformin (GLUCOPHAGE) 1000 MG tablet; Take 1 Tab by mouth 2 times a day.  Dispense: 180 Tab; Refill: 3    2. Dyslipidemia  Rechecking cholesterol before next visit  - Comp Metabolic Panel; Future  - Lipid Profile; Future    3. Essential hypertension  Currently well controlled, Discuss diet, exercise and salt restriction.  No change to medication therapy.    4. Screening PSA (prostate specific antigen)    - PROSTATE SPECIFIC AG SCREENING; Future    5. Need for hepatitis C screening test    - HEP C VIRUS ANTIBODY; Future

## 2020-07-22 DIAGNOSIS — I10 ESSENTIAL HYPERTENSION: ICD-10-CM

## 2020-07-22 RX ORDER — HYDROCHLOROTHIAZIDE 25 MG/1
TABLET ORAL
Qty: 30 TAB | Refills: 11 | Status: SHIPPED | OUTPATIENT
Start: 2020-07-22

## 2020-09-02 RX ORDER — LOSARTAN POTASSIUM 100 MG/1
TABLET ORAL
Qty: 30 TAB | Refills: 5 | Status: SHIPPED | OUTPATIENT
Start: 2020-09-02

## 2020-10-29 RX ORDER — PEN NEEDLE, DIABETIC 32GX 5/32"
NEEDLE, DISPOSABLE MISCELLANEOUS
Qty: 100 EACH | Refills: 11 | Status: SHIPPED | OUTPATIENT
Start: 2020-10-29 | End: 2021-12-06

## 2020-12-09 ENCOUNTER — HOSPITAL ENCOUNTER (OUTPATIENT)
Dept: LAB | Facility: MEDICAL CENTER | Age: 65
End: 2020-12-09
Attending: INTERNAL MEDICINE
Payer: COMMERCIAL

## 2020-12-09 DIAGNOSIS — E78.5 DYSLIPIDEMIA: ICD-10-CM

## 2020-12-09 DIAGNOSIS — Z11.59 NEED FOR HEPATITIS C SCREENING TEST: ICD-10-CM

## 2020-12-09 DIAGNOSIS — Z12.5 SCREENING PSA (PROSTATE SPECIFIC ANTIGEN): ICD-10-CM

## 2020-12-09 DIAGNOSIS — E11.9 CONTROLLED TYPE 2 DIABETES MELLITUS WITHOUT COMPLICATION, WITHOUT LONG-TERM CURRENT USE OF INSULIN (HCC): ICD-10-CM

## 2020-12-09 LAB
ALBUMIN SERPL BCP-MCNC: 4.1 G/DL (ref 3.2–4.9)
ALBUMIN/GLOB SERPL: 1.3 G/DL
ALP SERPL-CCNC: 72 U/L (ref 30–99)
ALT SERPL-CCNC: 15 U/L (ref 2–50)
ANION GAP SERPL CALC-SCNC: 12 MMOL/L (ref 7–16)
AST SERPL-CCNC: 15 U/L (ref 12–45)
BILIRUB SERPL-MCNC: 0.5 MG/DL (ref 0.1–1.5)
BUN SERPL-MCNC: 10 MG/DL (ref 8–22)
CALCIUM SERPL-MCNC: 9.5 MG/DL (ref 8.4–10.2)
CHLORIDE SERPL-SCNC: 98 MMOL/L (ref 96–112)
CHOLEST SERPL-MCNC: 116 MG/DL (ref 100–199)
CO2 SERPL-SCNC: 27 MMOL/L (ref 20–33)
CREAT SERPL-MCNC: 1.11 MG/DL (ref 0.5–1.4)
CREAT UR-MCNC: 141.83 MG/DL
EST. AVERAGE GLUCOSE BLD GHB EST-MCNC: 131 MG/DL
FASTING STATUS PATIENT QL REPORTED: NORMAL
GLOBULIN SER CALC-MCNC: 3.2 G/DL (ref 1.9–3.5)
GLUCOSE SERPL-MCNC: 133 MG/DL (ref 65–99)
HBA1C MFR BLD: 6.2 % (ref 0–5.6)
HCV AB SER QL: NORMAL
HDLC SERPL-MCNC: 42 MG/DL
LDLC SERPL CALC-MCNC: 40 MG/DL
MICROALBUMIN UR-MCNC: 3.1 MG/DL
MICROALBUMIN/CREAT UR: 22 MG/G (ref 0–30)
POTASSIUM SERPL-SCNC: 3.9 MMOL/L (ref 3.6–5.5)
PROT SERPL-MCNC: 7.3 G/DL (ref 6–8.2)
PSA SERPL-MCNC: 0.45 NG/ML (ref 0–4)
SODIUM SERPL-SCNC: 137 MMOL/L (ref 135–145)
TRIGL SERPL-MCNC: 168 MG/DL (ref 0–149)

## 2020-12-09 PROCEDURE — 83036 HEMOGLOBIN GLYCOSYLATED A1C: CPT

## 2020-12-09 PROCEDURE — 84153 ASSAY OF PSA TOTAL: CPT

## 2020-12-09 PROCEDURE — 82570 ASSAY OF URINE CREATININE: CPT

## 2020-12-09 PROCEDURE — 82043 UR ALBUMIN QUANTITATIVE: CPT

## 2020-12-09 PROCEDURE — 86803 HEPATITIS C AB TEST: CPT

## 2020-12-09 PROCEDURE — 36415 COLL VENOUS BLD VENIPUNCTURE: CPT

## 2020-12-09 PROCEDURE — 80053 COMPREHEN METABOLIC PANEL: CPT

## 2020-12-09 PROCEDURE — 80061 LIPID PANEL: CPT

## 2021-01-21 ENCOUNTER — PATIENT MESSAGE (OUTPATIENT)
Dept: MEDICAL GROUP | Facility: MEDICAL CENTER | Age: 66
End: 2021-01-21

## 2021-01-21 DIAGNOSIS — E11.9 CONTROLLED TYPE 2 DIABETES MELLITUS WITHOUT COMPLICATION, WITHOUT LONG-TERM CURRENT USE OF INSULIN (HCC): ICD-10-CM

## 2021-01-21 NOTE — PATIENT COMMUNICATION
Received request via: Pharmacy    Was the patient seen in the last year in this department? Yes    Does the patient have an active prescription (recently filled or refills available) for medication(s) requested? No    Requested Prescriptions     Pending Prescriptions Disp Refills   • liraglutide (VICTOZA) 18 MG/3ML Solution Pen-injector 9 Each 0     Sig: Inject 1.8 mg under the skin every day.

## 2021-01-23 RX ORDER — LIRAGLUTIDE 6 MG/ML
1.8 INJECTION SUBCUTANEOUS DAILY
Qty: 3 EACH | Refills: 0 | Status: SHIPPED | OUTPATIENT
Start: 2021-01-23 | End: 2021-02-22

## 2021-02-22 DIAGNOSIS — E11.9 CONTROLLED TYPE 2 DIABETES MELLITUS WITHOUT COMPLICATION, WITHOUT LONG-TERM CURRENT USE OF INSULIN (HCC): ICD-10-CM

## 2021-02-22 RX ORDER — LIRAGLUTIDE 6 MG/ML
INJECTION SUBCUTANEOUS
Qty: 9 ML | Refills: 1 | Status: SHIPPED | OUTPATIENT
Start: 2021-02-22

## 2021-12-04 DIAGNOSIS — E11.9 CONTROLLED TYPE 2 DIABETES MELLITUS WITHOUT COMPLICATION, WITHOUT LONG-TERM CURRENT USE OF INSULIN (HCC): ICD-10-CM

## 2021-12-06 RX ORDER — PEN NEEDLE, DIABETIC 32GX 5/32"
NEEDLE, DISPOSABLE MISCELLANEOUS
Qty: 100 EACH | Refills: 11 | Status: SHIPPED | OUTPATIENT
Start: 2021-12-06